# Patient Record
Sex: MALE | Race: WHITE | URBAN - METROPOLITAN AREA
[De-identification: names, ages, dates, MRNs, and addresses within clinical notes are randomized per-mention and may not be internally consistent; named-entity substitution may affect disease eponyms.]

---

## 2017-01-03 ENCOUNTER — OFFICE VISIT (OUTPATIENT)
Dept: PEDIATRICS | Facility: CLINIC | Age: 22
End: 2017-01-03
Attending: PEDIATRICS
Payer: COMMERCIAL

## 2017-01-03 DIAGNOSIS — E10.65 TYPE 1 DIABETES MELLITUS WITH HYPERGLYCEMIA (H): Primary | ICD-10-CM

## 2017-01-03 PROCEDURE — 95250 CONT GLUC MNTR PHYS/QHP EQP: CPT | Mod: ZF

## 2017-01-12 ENCOUNTER — TELEPHONE (OUTPATIENT)
Dept: PEDIATRICS | Facility: CLINIC | Age: 22
End: 2017-01-12

## 2017-01-12 NOTE — TELEPHONE ENCOUNTER
Prior Authorization Retail Medication Request  Medication/Dose: Apidra Insulin Vial-Uses up to 75 units daily via insulin pump (90 day supply)  Diagnosis and ICD code: E10.65  New/Renewal/Insurance Change PA: Renewal (?)  Previously Tried and Failed Therapies: Yes (Novolog and Humalog, he was switched to Apidra in September 2014)    Insurance ID (if provided): [employer]VS Waqar, [id number]  Insurance Phone (if provided):     Any additional info from fax request: Analog insulins (Novolog and Humalog) have caused allergic reactions for Jarred causing lipoatrophy.  Apidra has worked to not cause this issue.    If you received a fax notification from an outside Pharmacy:  Pharmacy Name: [facility]  Pharmacy #: [phone]   Pharmacy Fax: [phone]

## 2017-01-13 NOTE — TELEPHONE ENCOUNTER
[facility] Prior Authorization Team   Phone: [phone]  Fax: [phone]    PA Initiation    Medication: Apidra Insulin Vial-Uses up to 75 units daily via insulin pump (90 day supply)  Insurance Company: [employer]  Fax Number:      Phone Number: [phone]  Pharmacy Filling the Rx: [id number] [address]  Filling Pharmacy Phone: [phone]  Filling Pharmacy Fax: [phone]  Start Date: 1/13/2017

## 2017-01-17 NOTE — TELEPHONE ENCOUNTER
Prior Authorization Approval    Authorization Effective Date: 12/15/2016  Authorization Expiration Date: 1/14/2018  Medication: Apidra Insulin Vial-Uses up to 75 units daily via insulin pump (90 day supply)- approved  Approved Dose/Quantity:   Reference #:     Insurance Company:    Expected CoPay: $75.00     CoPay Card Available:      Foundation Assistance Needed:    Which Pharmacy is filling the prescription (Not needed for infusion/clinic administered): [id number] - [place], [place] - [address]  Pharmacy Notified:  yes  Patient Notified:  Pt picked up script on 01/12/17

## 2017-02-02 NOTE — PROGRESS NOTES
Data:  Juarez Pemberton  presents today for: Return type 1 diabetes  Juarez Pemberton is a 21 year old year old male.  Parent's names are: Tereza Felton (mother)  Juarez lives with mother.  HEMOGLOBIN A1C   Date Value Ref Range Status   12/27/2016 8.6 % Final     GLUCOSE   Date Value Ref Range Status   04/26/2016 127* 70 - 99 mg/dL Final     Intervention:   Education Topics discussed today:  Continuous glucose sensors   Inserted Dexcom professional CGM  Explained Calibration Rules  Benefits of CGM therapy  Assessment: Juarez and his mother verbalizes understanding of what was discussed today.    Plan:   Return to clinic in one week to return Dexcom.  Follow up as needed  Patient goal: possibly start own CGM after trial  Time spent with patient at today s visit was 30 minutes.

## 2017-05-16 ENCOUNTER — OFFICE VISIT (OUTPATIENT)
Dept: PEDIATRICS | Facility: CLINIC | Age: 22
End: 2017-05-16
Attending: PEDIATRICS
Payer: COMMERCIAL

## 2017-05-16 VITALS
WEIGHT: 121.03 LBS | HEART RATE: 77 BPM | HEIGHT: 65 IN | DIASTOLIC BLOOD PRESSURE: 66 MMHG | SYSTOLIC BLOOD PRESSURE: 105 MMHG | BODY MASS INDEX: 20.17 KG/M2

## 2017-05-16 DIAGNOSIS — E10.65 TYPE 1 DIABETES MELLITUS WITH HYPERGLYCEMIA (H): Primary | ICD-10-CM

## 2017-05-16 LAB — HBA1C MFR BLD: 8.9 % (ref 0–5.7)

## 2017-05-16 PROCEDURE — 83036 HEMOGLOBIN GLYCOSYLATED A1C: CPT | Mod: ZF | Performed by: PEDIATRICS

## 2017-05-16 PROCEDURE — 99211 OFF/OP EST MAY X REQ PHY/QHP: CPT | Mod: ZF

## 2017-05-16 ASSESSMENT — PAIN SCALES - GENERAL: PAINLEVEL: NO PAIN (0)

## 2017-05-16 NOTE — PATIENT INSTRUCTIONS
Focus needs to be on the following two things:  1) must start giving insulin before eating - really should be 10-15 minutes before eating.  Need to get away from postmeal bolus.  Even if you bolus for half of your upcoming meal about 10-15 minutes before eating and the rest of it at the time of your meal, that would really help your overall control  2) Remain consistent with your testing.  You should not have any nights when you go to bed without testing so that you can provide a bit of a buffer overnight if low or correct if you are high.     Omnipod   Basal rates:   12 AM: 0.9  7 AM: 1.1  12 PM: 1.2  9PM: 1.0    Carb ratio   12 AM: 10  1030 AM: 10    Correction factor   12 AM: 45  7 AM: 40  8PM: 45    Target Blood Glucose   12A target and threshold 130  7A target 80, threshold 130   9A target 110 threshold 130    Stop by lab before going back to [place] for fasting lab test to recheck your lipid panel and recheck urine protein.  Follow-up in August

## 2017-05-16 NOTE — MR AVS SNAPSHOT
After Visit Summary   5/16/2017    Juarez Pemberton    MRN: [id number]           Patient Information     Date Of Birth          1995        Visit Information        Provider Department      5/16/2017 10:20 AM Angel Escalante MD [facility]        Today's Diagnoses     Type 1 diabetes mellitus (H)    -  1    Type 1 diabetes mellitus with hyperglycemia (H)          Care Instructions    Focus needs to be on the following two things:  1) must start giving insulin before eating - really should be 10-15 minutes before eating.  Need to get away from postmeal bolus.  Even if you bolus for half of your upcoming meal about 10-15 minutes before eating and the rest of it at the time of your meal, that would really help your overall control  2) Remain consistent with your testing.  You should not have any nights when you go to bed without testing so that you can provide a bit of a buffer overnight if low or correct if you are high.     Omnipod   Basal rates:   12 AM: 0.9  7 AM: 1.1  12 PM: 1.2  9PM: 1.0    Carb ratio   12 AM: 10  1030 AM: 10    Correction factor   12 AM: 45  7 AM: 40  8PM: 45    Target Blood Glucose   12A target and threshold 130  7A target 80, threshold 130   9A target 110 threshold 130    Stop by lab before going back to [place] for fasting lab test to recheck your lipid panel and recheck urine protein.  Follow-up in August        Follow-ups after your visit        Future tests that were ordered for you today     Open Future Orders        Priority Expected Expires Ordered    Lipid Profile Routine 5/18/2017 5/16/2018 5/16/2017    Basic metabolic panel Routine 5/18/2017 5/16/2018 5/16/2017    Albumin Random Urine Quantitative Routine 5/18/2017 5/16/2018 5/16/2017    TSH with free T4 reflex Routine 5/18/2017 5/16/2018 5/16/2017            Who to contact     If you have questions or need follow up information about today's clinic visit or your schedule please  "contact [facility] directly at [phone].  Normal or non-critical lab and imaging results will be communicated to you by MyChart, letter or phone within 4 business days after the clinic has received the results. If you do not hear from us within 7 days, please contact the clinic through MyChart or phone. If you have a critical or abnormal lab result, we will notify you by phone as soon as possible.  Submit refill requests through [employer] or call your pharmacy and they will forward the refill request to us. Please allow 3 business days for your refill to be completed.          Additional Information About Your Visit        [employer] Information     [employer] lets you send messages to your doctor, view your test results, renew your prescriptions, schedule appointments and more. To sign up, go to [email or web address] . Click on \"Log in\" on the left side of the screen, which will take you to the Welcome page. Then click on \"Sign up Now\" on the right side of the page.     You will be asked to enter the access code listed below, as well as some personal information. Please follow the directions to create your username and password.     Your access code is: [email or web address]  Expires: 2017 11:16 AM     Your access code will  in 90 days. If you need help or a new code, please call your [facility] clinic or [phone].        Care EveryWhere ID     This is your Care EveryWhere ID. This could be used by other organizations to access your [place] medical records  [id number]        Your Vitals Were     Pulse Height BMI (Body Mass Index)             77 1.66 m (5' 5.35\") 19.92 kg/m2          Blood Pressure from Last 3 Encounters:   17 105/66   16 113/79   16 111/57    Weight from Last 3 Encounters:   17 54.9 kg (121 lb 0.5 oz) (4 %)*   16 55.3 kg (121 lb 14.6 oz) (5 %)*   16 54.4 kg (119 lb 14.9 oz) (5 %)*     * Growth percentiles are based on CDC " 2-20 Years data.              We Performed the Following     Hemoglobin A1c POCT        Primary Care Provider Office Phone # Fax #    Markell Zacarias [phone]       [facility] [address] [facility]  [address]  [facility] [address]        Thank you!     Thank you for choosing [facility]  for your care. Our goal is always to provide you with excellent care. Hearing back from our patients is one way we can continue to improve our services. Please take a few minutes to complete the written survey that you may receive in the mail after your visit with us. Thank you!             Your Updated Medication List - Protect others around you: Learn how to safely use, store and throw away your medicines at www.disposemymeds.org.          This list is accurate as of: 5/16/17 11:16 AM.  Always use your most recent med list.                   Brand Name Dispense Instructions for use    acetone (Urine) test Strp    KETOSTIX    50 each    Patient to test when blood glucose is >250x2 or when sick and vomiting       Cromolyn Sodium Powd     500 g    Please compound in petrolatum solvent to make 4% topical cream. Patient is to apply cream to old areas of lipoatrophy twice daily       FREESTYLE TEST STRIPS test strip   Generic drug:  blood glucose monitoring     300 each    Patient tests up to 10 times/day       glucagon 1 MG kit    GLUCAGON EMERGENCY    1 each    Inject 1 mg intramuscular for unconscious hypoglycemia.       insulin glulisine 100 UNIT/ML injection    APIDRA VIAL    70 mL    Using up to 75 Units/day       * insulin detemir 100 UNIT/ML injection    LEVEMIR VIAL    10 mL    25 Units once daily when off pump       * LEVEMIR VIAL 100 UNIT/ML injection   Generic drug:  insulin detemir      Inject  Subcutaneous See Admin Instructions.       * Notice:  This list has 2 medication(s) that are the same as other medications prescribed for you. Read the directions  carefully, and ask your doctor or other care provider to review them with you.

## 2017-05-16 NOTE — PROGRESS NOTES
Pediatric Endocrinology Follow-up Consultation: Diabetes    Patient: Juarez Pemberton MRN# [id number]   YOB: 1995 Age: 20 year old   Date of Visit: 5/16/2017    Dear Dr. Markell Zacarias:    I had the pleasure of seeing your patient, Juarez Pemberton in the Pediatric Endocrinology Clinic, [address], on 5/16/2017 for a follow-up consultation of type 1 diabetes mellitus.           Problem list:     Patient Active Problem List    Diagnosis Date Noted     Type 1 diabetes mellitus (H) 02/21/2012     Problem list name updated by automated process. Provider to review              HPI:   Juarez is a 21 year old male with Type 1 diabetes mellitus, who was accompanied by his mother.  He was last seen in clinic on 12/27/2016. We made a slight adjustment to basal rates in afternoon and carb ratio at our last visit.  I encouraged him to keep up consistent testing and consider the use of CGM, especially because he had too many highs in the morning.  He has been at college at [employer] in [place].  Playing [employer] at school and biking.  Suspending for 1-2 hours if he knows he is going to be exercising a lot.  Sometimes in range, sometimes still lwo and sometimes high.  He has had to back down on his basal rates across the board a bit due to lows.  Ill over past week with cold symptoms and numbers have been more up and down.  He is noting higher numbers in the evenings (6-10pm).  Not cutting back on carbs.  Bolus is 5-10 minutes after eating.    Today's concerns include: none    Hypoglycemia: Juarez is having 4 hypoglycemic readings per week.   Hyperglycemia:   DKA: none  Elevated BG values tend to occur     Exercise: Biking, walking in woods    Blood Glucose Data:   Overall average: 185 mg/dL,   Breakfast: 158 mg/dL  Lunch: 194 mg/dL  Dinner: 164 mg/dL  Bedtime: 216mg/dL  BG checks/day: 3.1   Highs after lunch, tight mid morning, high overnight, low between  8-10pm       A1c:  Today s hemoglobin A1c: 8.9  Previous two HbA1c results:   Lab Results   Component Value Date    A1C 8.6 12/27/2016    A1C 8.5 08/23/2016    A1C 8.6 04/26/2016    A1C 8.4 01/19/2016    A1C 8.5 10/20/2015       Result was discussed at today's visit.     Current insulin regimen:    Omnipod   Basal rates:   12 AM: 0.9  7 AM: 1.1  1PM: 1.2  9PM: 1.0    Carb ratio   12 AM: 10  1030 AM: 9    Correction factor   12 AM: 35    Target Blood Glucose   12A target and threshold 130  7A target 80, threshold 130   9A target 110 threshold 130    Insulin administration site(s): abdomen, arms - changing every 3-4 days    Total insulin 42.9 (decrease by 7 units)  Basal 49% (increase by 2%)  About 4 boluses on average    I reviewed new history from the patient and the medical record.  I have reviewed previous lab results and records, patient BMI and the growth chart at today's visit.  I have reviewed the pump download,  glucometer download, .    History was obtained from patient.          Social History:     Social History     Social History Narrative    Starting college this fall at [employer] in [place].     School going well - probably computer science and electrical engineering - living in dorm  Internship planned in [employer] for summer - renting a room.         Family History:   No family history on file.    Family history was reviewed and is unchanged. Refer to the initial note.         Allergies:   No Known Allergies          Medications:     Current Outpatient Prescriptions   Medication Sig Dispense Refill     FREESTYLE TEST STRIPS test strip Patient tests up to 10 times/day 300 each 11     acetone, Urine, test (KETOSTIX) STRP Patient to test when blood glucose is >250x2 or when sick and vomiting 50 each 3     glucagon (GLUCAGON EMERGENCY) 1 MG injection Inject 1 mg intramuscular for unconscious hypoglycemia. 1 each 4     insulin glulisine (APIDRA VIAL) 100 UNIT/ML VIAL Using up to 75 Units/day 70  "mL 4     insulin detemir (LEVEMIR VIAL) 100 UNIT/ML VIAL 25 Units once daily when off pump 10 mL 6     Cromolyn Sodium POWD Please compound in petrolatum solvent to make 4% topical cream. Patient is to apply cream to old areas of lipoatrophy twice daily 500 g 6     insulin detemir (LEVEMIR) 100 UNIT/ML injection Inject  Subcutaneous See Admin Instructions.               Review of Systems:   GENERAL:  Had a good energy level and appetite and is sleeping well.  EYE: No visual disturbance.  ENT: No hearing loss.  No nasal discharge.  No sore throat.  RESPIRATORY: No cough or wheezing  CARDIO: No chest pain. No palpitations.  No rapid heart rate. No hypertension.  GASTROINTESTINAL: No recent vomiting or diarrhea. No constipation. No abdominal pain.  HEMATOLOGIC: No bleeding disorders. No amemia.  GENITOURINARY: No dysuria or hematuria.  MUSCOLOSKELETAL: No joint pain. No muscular weakness.  PSYCHIATRIC: No significant sadness or irritability. No behavior concerns.  NEURO: No seizures.  No headaches. No focal deficits noted.  SKIN: No rashes or skin changes.  ENDOCRINE: see HPI         Physical Exam:   Blood pressure 105/66, pulse 77, height 1.66 m (5' 5.35\"), weight 54.9 kg (121 lb 0.5 oz).  Blood pressure percentiles are 11 % systolic and 21 % diastolic based on NHBPEP's 4th Report. Blood pressure percentile targets: 90: 132/90, 95: 135/95, 99 + 5 mmH/108.  Height: 5' 5.354\", 7 %ile based on CDC 2-20 Years stature-for-age data using vitals from 2017.  Weight: 121 lbs .52 oz, 4 %ile based on CDC 2-20 Years weight-for-age data using vitals from 2017.  BMI: Body mass index is 19.92 kg/(m^2)., 12 %ile based on CDC 2-20 Years BMI-for-age data using vitals from 2017.      CONSTITUTIONAL:   Awake, alert, and in no apparent distress.  HEAD: Normocephalic, without obvious abnormality.  EYES: Lids and lashes normal, sclera clear, conjunctiva normal.  ENT: external ears without lesions, nares clear, oral " pharynx with moist mucus membranes.  NECK: Supple, symmetrical, trachea midline.  THYROID: symmetric, not enlarged and no tenderness.  HEMATOLOGIC/LYMPHATIC: No cervical lymphadenopathy.  LUNGS: No increased work of breathing, clear to auscultation bilaterally with good air entry.  CARDIOVASCULAR: Regular rate and rhythm, no murmurs.  ABDOMEN: Normal bowel sounds, soft, non-distended, non-tender, no masses palpated, no hepatosplenomegally.  NEUROLOGIC:No focal deficits noted. Reflexes were symmetric at patella bilaterally.  PSYCHIATRIC: Cooperative, no agitation.  SKIN: Insulin administration sites intact without lipohypertrophy. No acanthosis nigricans.  Omnipod in place on right upper extremity.   MUSCULOSKELETAL: There is no redness, warmth, or swelling of the joints.  Full range of motion noted.  Motor strength and tone are normal.        Health Maintenance:   Last yearly labs: 4/16  Last dental exam: 8/16  Ophtho: 3/17 - [facility]  Last influenza vaccine: 4738-5565 season  Blood pressure IS consistently <130/80 or below the 90th percentile for age, sex, and height whichever is lower.   Severe hypoglycemia since last visit: None   DKA episodes since last visit: None  Today's PHQ-2 Score: 0        Laboratory results:     Hemoglobin A1C   Date Value Ref Range Status   12/27/2016 8.6 % Final     TSH   Date Value Ref Range Status   04/26/2016 0.83 0.40 - 4.00 mU/L Final     T4 Free   Date Value Ref Range Status   11/01/2011 1.04 0.70 - 1.85 ng/dL Final     Tissue Transglutaminase Antibody IgA   Date Value Ref Range Status   04/26/2016 1 <7 U/mL Final     Comment:     Negative     Tissue Transglutaminase Pamela IgG   Date Value Ref Range Status   04/26/2016 1 <7 U/mL Final     Comment:     Negative     Cholesterol   Date Value Ref Range Status   04/26/2016 192 (H) <170 mg/dL Final     Comment:     Borderline high:  170-199 mg/dl   High:            >199 mg/dl       Albumin Urine mg/L   Date Value Ref Range  Status   04/26/2016 15 mg/L Final     Triglycerides   Date Value Ref Range Status   04/26/2016 160 (H) <90 mg/dL Final     Comment:     Borderline high:   mg/dl   High:            >129 mg/dl       HDL Cholesterol   Date Value Ref Range Status   04/26/2016 50 >45 mg/dL Final     LDL Cholesterol Calculated   Date Value Ref Range Status   04/26/2016 110 (H) <110 mg/dL Final     Comment:     Borderline high:  110-129 mg/dl   High:            >129 mg/dl       Cholesterol/HDL Ratio   Date Value Ref Range Status   04/10/2015 3.3 0.0 - 5.0 Final     Non HDL Cholesterol   Date Value Ref Range Status   04/26/2016 142 (H) <120 mg/dL Final     Comment:     Borderline high:  120-144 mg/dl   High:            >144 mg/dl              Assessment and Plan:   Juarez  is a 20 year old male with Type 1 diabetes mellitus.  Jarred is just not getting the level of control that he needs.  Unfortunately, he did not give the CGM much of a try which I think could have really helped.  He is not testing enough at school and is bolusing after he eats which absolutely has to change in order for to achieve adequate glycemic control.  I am hopeful that backing off in a few areas of his pump will allow Jarred to feel comfortable giving his insulin before eating.  I would like to follow up on his relative dyslipidemic panel from last year with a fasting test before he goes back to school.    Orders Placed This Encounter   Procedures     Hemoglobin A1c POCT     Lipid Profile     Basic metabolic panel     Albumin Random Urine Quantitative     TSH with free T4 reflex     Patient Instructions   Focus needs to be on the following two things:  1) must start giving insulin before eating - really should be 10-15 minutes before eating.  Need to get away from postmeal bolus.  Even if you bolus for half of your upcoming meal about 10-15 minutes before eating and the rest of it at the time of your meal, that would really help your overall control  2) Remain  consistent with your testing.  You should not have any nights when you go to bed without testing so that you can provide a bit of a buffer overnight if low or correct if you are high.     Omnipod   Basal rates:   12 AM: 0.9  7 AM: 1.1  12 PM: 1.2  9PM: 1.0    Carb ratio   12 AM: 10  1030 AM: 10    Correction factor   12 AM: 45  7 AM: 40  8PM: 45    Target Blood Glucose   12A target and threshold 130  7A target 80, threshold 130   9A target 110 threshold 130    Stop by lab before going back to [place] for fasting lab test to recheck your lipid panel and recheck urine protein.  Follow-up in August        Thank you for allowing me to participate in the care of your patient.  Please do not hesitate to call Dr Escalante with questions or concerns.    Sincerely,    Angel Escalante MD    Pager [phone]        CC  Patient Care Team:  Markell Zacarias MD as PCP - General (Pediatrics)  MARKELL ZACARIAS    Copy to patient  Tereza Felton   [address] [facility] [id number]

## 2017-05-16 NOTE — NURSING NOTE
"Informant-    Juarez is accompanied by self    Reason for Visit-  F/u diabetes    Vitals signs-  /66  Pulse 77  Ht 1.66 m (5' 5.35\")  Wt 54.9 kg (121 lb 0.5 oz)  BMI 19.92 kg/m2    Face to Face time: 3 min    Opal Brown RN        "

## 2017-05-18 ENCOUNTER — TRANSFERRED RECORDS (OUTPATIENT)
Dept: HEALTH INFORMATION MANAGEMENT | Facility: CLINIC | Age: 22
End: 2017-05-18

## 2017-05-19 ENCOUNTER — TRANSFERRED RECORDS (OUTPATIENT)
Dept: HEALTH INFORMATION MANAGEMENT | Facility: CLINIC | Age: 22
End: 2017-05-19

## 2017-08-22 ENCOUNTER — OFFICE VISIT (OUTPATIENT)
Dept: PEDIATRICS | Facility: CLINIC | Age: 22
End: 2017-08-22
Attending: PEDIATRICS
Payer: COMMERCIAL

## 2017-08-22 ENCOUNTER — HOSPITAL ENCOUNTER (OUTPATIENT)
Dept: LAB | Facility: CLINIC | Age: 22
Discharge: HOME | End: 2017-08-22
Attending: PEDIATRICS | Admitting: PEDIATRICS
Payer: COMMERCIAL

## 2017-08-22 VITALS
SYSTOLIC BLOOD PRESSURE: 117 MMHG | WEIGHT: 126.32 LBS | BODY MASS INDEX: 20.3 KG/M2 | DIASTOLIC BLOOD PRESSURE: 73 MMHG | HEART RATE: 74 BPM | HEIGHT: 66 IN

## 2017-08-22 DIAGNOSIS — E10.65 TYPE 1 DIABETES MELLITUS WITH HYPERGLYCEMIA (H): Primary | ICD-10-CM

## 2017-08-22 DIAGNOSIS — E10.65 TYPE 1 DIABETES MELLITUS WITH HYPERGLYCEMIA (H): ICD-10-CM

## 2017-08-22 LAB
ANION GAP SERPL CALCULATED.3IONS-SCNC: 3 MMOL/L (ref 3–14)
BUN SERPL-MCNC: 14 MG/DL (ref 7–30)
CALCIUM SERPL-MCNC: 9.3 MG/DL (ref 8.5–10.1)
CHLORIDE SERPL-SCNC: 104 MMOL/L (ref 98–110)
CHOLEST SERPL-MCNC: 211 MG/DL
CO2 SERPL-SCNC: 33 MMOL/L (ref 20–32)
CREAT SERPL-MCNC: 0.88 MG/DL (ref 0.5–1)
CREAT UR-MCNC: 285 MG/DL
GFR SERPL CREATININE-BSD FRML MDRD: >90 ML/MIN/1.7M2
GLUCOSE SERPL-MCNC: 155 MG/DL (ref 70–99)
HBA1C MFR BLD: 8.2 % (ref 0–5.7)
HDLC SERPL-MCNC: 62 MG/DL
LDLC SERPL CALC-MCNC: 137 MG/DL
MICROALBUMIN UR-MCNC: 13 MG/L
MICROALBUMIN/CREAT UR: 4.42 MG/G CR (ref 0–17)
NONHDLC SERPL-MCNC: 149 MG/DL
POTASSIUM SERPL-SCNC: 4.1 MMOL/L (ref 3.4–5.3)
SODIUM SERPL-SCNC: 140 MMOL/L (ref 133–144)
TRIGL SERPL-MCNC: 60 MG/DL
TSH SERPL DL<=0.005 MIU/L-ACNC: 0.88 MU/L (ref 0.4–4)

## 2017-08-22 PROCEDURE — 84443 ASSAY THYROID STIM HORMONE: CPT | Performed by: PEDIATRICS

## 2017-08-22 PROCEDURE — 99211 OFF/OP EST MAY X REQ PHY/QHP: CPT | Mod: ZF

## 2017-08-22 PROCEDURE — 80048 BASIC METABOLIC PNL TOTAL CA: CPT | Performed by: PEDIATRICS

## 2017-08-22 PROCEDURE — 83036 HEMOGLOBIN GLYCOSYLATED A1C: CPT | Mod: ZF | Performed by: PEDIATRICS

## 2017-08-22 PROCEDURE — 80061 LIPID PANEL: CPT | Performed by: PEDIATRICS

## 2017-08-22 PROCEDURE — 36415 COLL VENOUS BLD VENIPUNCTURE: CPT | Performed by: PEDIATRICS

## 2017-08-22 PROCEDURE — 82043 UR ALBUMIN QUANTITATIVE: CPT | Performed by: PEDIATRICS

## 2017-08-22 ASSESSMENT — PAIN SCALES - GENERAL: PAINLEVEL: NO PAIN (0)

## 2017-08-22 NOTE — MR AVS SNAPSHOT
"              After Visit Summary   8/22/2017    Juarez Pemberton    MRN: [id number]           Patient Information     Date Of Birth          1995        Visit Information        Provider Department      8/22/2017 10:20 AM Angel Escalante MD [facility]        Today's Diagnoses     Type 1 diabetes mellitus with hyperglycemia (H)    -  1      Care Instructions    No changes to settings for today  Keep working on premeal bolusing! - 10 minutes before eating  Labs today  Follow-up over thanksgiving or winter break.            Follow-ups after your visit        Who to contact     If you have questions or need follow up information about today's clinic visit or your schedule please contact [facility] directly at [phone].  Normal or non-critical lab and imaging results will be communicated to you by [employer], letter or phone within 4 business days after the clinic has received the results. If you do not hear from us within 7 days, please contact the clinic through [employer] or phone. If you have a critical or abnormal lab result, we will notify you by phone as soon as possible.  Submit refill requests through [employer] or call your pharmacy and they will forward the refill request to us. Please allow 3 business days for your refill to be completed.          Additional Information About Your Visit        [employer] Information     [employer] lets you send messages to your doctor, view your test results, renew your prescriptions, schedule appointments and more. To sign up, go to [email or web address] . Click on \"Log in\" on the left side of the screen, which will take you to the Welcome page. Then click on \"Sign up Now\" on the right side of the page.     You will be asked to enter the access code listed below, as well as some personal information. Please follow the directions to create your username and password.     Your access code is: " [email or web address]  Expires: 2017 10:49 AM     Your access code will  in 90 days. If you need help or a new code, please call your [facility] clinic or [phone].        Care EveryWhere ID     This is your Care EveryWhere ID. This could be used by other organizations to access your [facility] medical records  [id number]        Your Vitals Were     Pulse Height BMI (Body Mass Index)             74 1.665 m (5' 5.55\") 20.67 kg/m2          Blood Pressure from Last 3 Encounters:   17 117/73   17 105/66   16 113/79    Weight from Last 3 Encounters:   17 57.3 kg (126 lb 5.2 oz) (8 %)*   17 54.9 kg (121 lb 0.5 oz) (4 %)*   16 55.3 kg (121 lb 14.6 oz) (5 %)*     * Growth percentiles are based on [facility] 2-20 Years data.              We Performed the Following     Hemoglobin A1c POCT        Primary Care Provider Office Phone # Fax #    Markell Zacarias [phone]       [facility] [address] [place] [address]        Equal Access to Services     BEBE FARAH : Hadii aad ku hadasho Soomaali, waaxda luqadaha, qaybta kaalmada adeegyada, christian wheat haymeaghan sheikh . So [facility] [phone].    ATENCIÓN: Si habla español, tiene a cadet disposición servicios gratuitos de asistencia lingüística. ame al [phone].    We comply with applicable federal civil rights laws and [place] laws. We do not discriminate on the basis of race, color, national origin, age, disability sex, sexual orientation or gender identity.            Thank you!     Thank you for choosing [facility]  for your care. Our goal is always to provide you with excellent care. Hearing back from our patients is one way we can continue to improve our services. Please take a few minutes to complete the written survey that you may receive in the mail after your visit with us. Thank you!             Your Updated Medication List - Protect " others around you: Learn how to safely use, store and throw away your medicines at www.disposemymeds.org.          This list is accurate as of: 8/22/17 10:49 AM.  Always use your most recent med list.                   Brand Name Dispense Instructions for use Diagnosis    acetone (Urine) test Strp    KETOSTIX    50 each    Patient to test when blood glucose is >250x2 or when sick and vomiting    Type 1 diabetes mellitus without complication (H)       Cromolyn Sodium Powd     500 g    Please compound in petrolatum solvent to make 4% topical cream. Patient is to apply cream to old areas of lipoatrophy twice daily    Type I (juvenile type) diabetes mellitus without mention of complication, not stated as uncontrolled       FREESTYLE TEST STRIPS test strip   Generic drug:  blood glucose monitoring     300 each    Patient tests up to 10 times/day    Type 1 diabetes mellitus without complication (H)       glucagon 1 MG kit    GLUCAGON EMERGENCY    1 each    Inject 1 mg intramuscular for unconscious hypoglycemia.    Type 1 diabetes mellitus without complication (H)       insulin glulisine 100 UNIT/ML injection    APIDRA VIAL    70 mL    Using up to 75 Units/day    Type 1 diabetes mellitus without complication (H)       * insulin detemir 100 UNIT/ML injection    LEVEMIR VIAL    10 mL    25 Units once daily when off pump    Type 1 diabetes mellitus without complication (H)       * LEVEMIR VIAL 100 UNIT/ML injection   Generic drug:  insulin detemir      Inject  Subcutaneous See Admin Instructions.    Diabetes mellitus, type 1       * Notice:  This list has 2 medication(s) that are the same as other medications prescribed for you. Read the directions carefully, and ask your doctor or other care provider to review them with you.

## 2017-08-22 NOTE — NURSING NOTE
"Informant-    Juarez is accompanied by mother    Reason for Visit-  Diabetes     Vitals signs-  /73  Pulse 74  Ht 1.665 m (5' 5.55\")  Wt 57.3 kg (126 lb 5.2 oz)  BMI 20.67 kg/m2    There are concerns about the child's exposure to violence in the home: No    Face to Face time: 5 minutes  Eli Rodriguez MA      "

## 2017-08-22 NOTE — LETTER
8/22/2017      RE: Juarez Pemberton  [address] [facility] [address]       Pediatric Endocrinology Follow-up Consultation: Diabetes    Patient: Juarez Pemberton MRN# [id number]   YOB: 1995 Age: 21 year old   Date of Visit: 8/22/2017    Dear Dr. Markell Zacarias:    I had the pleasure of seeing your patient, Juarez Pemberton in the Pediatric Endocrinology Clinic, [address], on 8/22/2017 for a follow-up consultation of type 1 diabetes mellitus.           Problem list:     Patient Active Problem List    Diagnosis Date Noted     Type 1 diabetes mellitus with hyperglycemia (H) 02/21/2012     Priority: Medium     Problem list name updated by automated process. Provider to review              HPI:   Juarez is a 21 year old male with Type 1 diabetes mellitus, who was accompanied by his mother.  He was last seen in clinic on 5/16/2017. His control had fallen off while he was at school.  I tried to rengage him on bolusing before eating.  Unfortunately he has not been keen on the use of CGM.  I did make some adjustments to his pump settings.    He did make a small increase in overnight basal rate and cut his afternoon basal rate since last visit.  No other new health problems since last visit. He is working on bolusing before hand is and is much more aware than at last visit -  Not 100% but workign towards it.      Today's concerns include: none    Hypoglycemia: Juarez is having 2 hypoglycemic readings per week - typically related to activity - running or biking    Hyperglycemia:   DKA: none  Elevated BG values tend to occur     Exercise:     Blood Glucose Data:   Overall average: 171 mg/dL, SD 79  Breakfast: 147 mg/dL  Lunch: 170 mg/dL  Dinner: 204 mg/dL  Bedtime: 162mg/dL  BG checks/day: 4.4       A1c:  Today s hemoglobin A1c: 8.2  Previous two HbA1c results:   Lab Results   Component Value Date    A1C 8.2 08/22/2017    A1C 8.9 05/16/2017     A1C 8.6 12/27/2016    A1C 8.5 08/23/2016    A1C 8.6 04/26/2016         Result was discussed at today's visit.     Current insulin regimen:    Omnipod   Basal rates:   12 AM: 1  7 AM: 1.1  9PM: 1.0    Carb ratio   12 AM: 10    Correction factor   12 AM: 45  7 AM: 40  8PM: 45    Target Blood Glucose   12A target and threshold 130  7A target 80, threshold 130   9A target 110 threshold 130    Insulin administration site(s): abdomen, arms - changing every 3-4 days    Total insulin 45.9 (increase by 3 units)  Basal 47% (decrease by 2%)  About 4 boluses on average    I reviewed new history from the patient and the medical record.  I have reviewed previous lab results and records, patient BMI and the growth chart at today's visit.  I have reviewed the pump download,  glucometer download, .    History was obtained from patient.          Social History:     Social History     Social History Narrative    Starting college this fall at [employer] in [place].     Internship in [facility] for summer - went well  Back home for 2 weeks before going back to school.         Family History:   No family history on file.    Family history was reviewed and is unchanged. Refer to the initial note.         Allergies:   No Known Allergies          Medications:     Current Outpatient Prescriptions   Medication Sig Dispense Refill     FREESTYLE TEST STRIPS test strip Patient tests up to 10 times/day 300 each 11     acetone, Urine, test (KETOSTIX) STRP Patient to test when blood glucose is >250x2 or when sick and vomiting 50 each 3     glucagon (GLUCAGON EMERGENCY) 1 MG injection Inject 1 mg intramuscular for unconscious hypoglycemia. 1 each 4     insulin glulisine (APIDRA VIAL) 100 UNIT/ML VIAL Using up to 75 Units/day 70 mL 4     insulin detemir (LEVEMIR VIAL) 100 UNIT/ML VIAL 25 Units once daily when off pump 10 mL 6     Cromolyn Sodium POWD Please compound in petrolatum solvent to make 4% topical cream. Patient is to apply cream to  "old areas of lipoatrophy twice daily 500 g 6     insulin detemir (LEVEMIR) 100 UNIT/ML injection Inject  Subcutaneous See Admin Instructions.               Review of Systems:   GENERAL:  Had a good energy level and appetite and is sleeping well.  EYE: No visual disturbance.  ENT: No hearing loss.  No nasal discharge.  No sore throat.  RESPIRATORY: No cough or wheezing  CARDIO: No chest pain. No palpitations.  No rapid heart rate. No hypertension.  GASTROINTESTINAL: No recent vomiting or diarrhea. No constipation. No abdominal pain.  HEMATOLOGIC: No bleeding disorders. No amemia.  GENITOURINARY: No dysuria or hematuria.  MUSCOLOSKELETAL: No joint pain. No muscular weakness.  PSYCHIATRIC: No significant sadness or irritability. No behavior concerns.  NEURO: No seizures.  No headaches. No focal deficits noted.  SKIN: No rashes or skin changes.  ENDOCRINE: see HPI         Physical Exam:   Blood pressure 117/73, pulse 74, height 1.665 m (5' 5.55\"), weight 57.3 kg (126 lb 5.2 oz).  Blood pressure percentiles are 46 % systolic and 36 % diastolic based on NHBPEP's 4th Report. Blood pressure percentile targets: 90: 132/92, 95: 136/96, 99 + 5 mmH/109.  Height: 5' 5.551\", 7 %ile based on CDC 2-20 Years stature-for-age data using vitals from 2017.  Weight: 126 lbs 5.18 oz, 8 %ile based on CDC 2-20 Years weight-for-age data using vitals from 2017.  BMI: Body mass index is 20.67 kg/(m^2)., 19 %ile based on CDC 2-20 Years BMI-for-age data using vitals from 2017.      CONSTITUTIONAL:   Awake, alert, and in no apparent distress.  HEAD: Normocephalic, without obvious abnormality.  EYES: Lids and lashes normal, sclera clear, conjunctiva normal.  ENT: external ears without lesions, nares clear, oral pharynx with moist mucus membranes.  NECK: Supple, symmetrical, trachea midline.  THYROID: symmetric, not enlarged and no tenderness.  HEMATOLOGIC/LYMPHATIC: No cervical lymphadenopathy.  LUNGS: No increased work of " breathing, clear to auscultation bilaterally with good air entry.  CARDIOVASCULAR: Regular rate and rhythm, no murmurs.  ABDOMEN: Normal bowel sounds, soft, non-distended, non-tender, no masses palpated, no hepatosplenomegally.  NEUROLOGIC:No focal deficits noted. Reflexes were symmetric at patella bilaterally.  PSYCHIATRIC: Cooperative, no agitation.  SKIN: Insulin administration sites intact without lipohypertrophy. No acanthosis nigricans.  Omnipod in place on right upper extremity.   MUSCULOSKELETAL: There is no redness, warmth, or swelling of the joints.  Full range of motion noted.  Motor strength and tone are normal.        Health Maintenance:   Last yearly labs: 4/16  Last dental exam: 8/17  Ophtho: 5/17 - [facility]  Last influenza vaccine: 8060-4258 season  Blood pressure IS consistently <130/80 or below the 90th percentile for age, sex, and height whichever is lower.   Severe hypoglycemia since last visit: None   DKA episodes since last visit: None  Today's PHQ-2 Score: 0        Laboratory results:     Hemoglobin A1C   Date Value Ref Range Status   08/22/2017 8.2 % Final     TSH   Date Value Ref Range Status   04/26/2016 0.83 0.40 - 4.00 mU/L Final     T4 Free   Date Value Ref Range Status   11/01/2011 1.04 0.70 - 1.85 ng/dL Final     Tissue Transglutaminase Antibody IgA   Date Value Ref Range Status   04/26/2016 1 <7 U/mL Final     Comment:     Negative     Tissue Transglutaminase Pamela IgG   Date Value Ref Range Status   04/26/2016 1 <7 U/mL Final     Comment:     Negative     Cholesterol   Date Value Ref Range Status   04/26/2016 192 (H) <170 mg/dL Final     Comment:     Borderline high:  170-199 mg/dl   High:            >199 mg/dl       Albumin Urine mg/L   Date Value Ref Range Status   04/26/2016 15 mg/L Final     Triglycerides   Date Value Ref Range Status   04/26/2016 160 (H) <90 mg/dL Final     Comment:     Borderline high:   mg/dl   High:            >129 mg/dl       HDL Cholesterol    Date Value Ref Range Status   04/26/2016 50 >45 mg/dL Final     LDL Cholesterol Calculated   Date Value Ref Range Status   04/26/2016 110 (H) <110 mg/dL Final     Comment:     Borderline high:  110-129 mg/dl   High:            >129 mg/dl       Cholesterol/HDL Ratio   Date Value Ref Range Status   04/10/2015 3.3 0.0 - 5.0 Final     Non HDL Cholesterol   Date Value Ref Range Status   04/26/2016 142 (H) <120 mg/dL Final     Comment:     Borderline high:  120-144 mg/dl   High:            >144 mg/dl              Assessment and Plan:   Juarez  is a 21 year old male with Type 1 diabetes mellitus with stable glycemic control and a  Nice improvement in his A1c.  Jarred has farily stable days with the majority of his BG levels in the euglycemic range.  I did not see any opportunities to make additional adjustments today but do feel he would benefit tremendously by premeal bolusing to make up that last 0.5% or so of his A1c difference.  I also think he would benefit from CGM device but he has been resistant to this modality.  I did request labs today to follow-up on his lipid panel.    Patient Instructions   No changes to settings for today  Keep working on premeal bolusing! - 10 minutes before eating  Labs today  Orders Placed This Encounter   Procedures     Hemoglobin A1c POCT       Follow-up over thanksgiving or winter break.      Thank you for allowing me to participate in the care of your patient.  Please do not hesitate to call Dr Escalante with questions or concerns.    Sincerely,    Angel Escalante MD    Pager [phone]        CC  Patient Care Team:  Markell Zacarias MD as PCP - General (Pediatrics)  MARKELL ZACARIAS    Copy to patient  Tereza Felton   [address] [facility] [address]    Angel Escalante MD

## 2017-08-22 NOTE — PROGRESS NOTES
Pediatric Endocrinology Follow-up Consultation: Diabetes    Patient: Juarez Pemberton MRN# [id number]   YOB: 1995 Age: 21 year old   Date of Visit: 8/22/2017    Dear Dr. Markell Zacarias:    I had the pleasure of seeing your patient, Juarez Pemberton in the Pediatric Endocrinology Clinic, [address], on 8/22/2017 for a follow-up consultation of type 1 diabetes mellitus.           Problem list:     Patient Active Problem List    Diagnosis Date Noted     Type 1 diabetes mellitus with hyperglycemia (H) 02/21/2012     Priority: Medium     Problem list name updated by automated process. Provider to review              HPI:   Juarez is a 21 year old male with Type 1 diabetes mellitus, who was accompanied by his mother.  He was last seen in clinic on 5/16/2017. His control had fallen off while he was at school.  I tried to rengage him on bolusing before eating.  Unfortunately he has not been keen on the use of CGM.  I did make some adjustments to his pump settings.    He did make a small increase in overnight basal rate and cut his afternoon basal rate since last visit.  No other new health problems since last visit. He is working on bolusing before hand is and is much more aware than at last visit -  Not 100% but workign towards it.      Today's concerns include: none    Hypoglycemia: Juarez is having 2 hypoglycemic readings per week - typically related to activity - running or biking    Hyperglycemia:   DKA: none  Elevated BG values tend to occur     Exercise:     Blood Glucose Data:   Overall average: 171 mg/dL, SD 79  Breakfast: 147 mg/dL  Lunch: 170 mg/dL  Dinner: 204 mg/dL  Bedtime: 162mg/dL  BG checks/day: 4.4       A1c:  Today s hemoglobin A1c: 8.2  Previous two HbA1c results:   Lab Results   Component Value Date    A1C 8.2 08/22/2017    A1C 8.9 05/16/2017    A1C 8.6 12/27/2016    A1C 8.5 08/23/2016    A1C 8.6 04/26/2016         Result was discussed at  today's visit.     Current insulin regimen:    Omnipod   Basal rates:   12 AM: 1  7 AM: 1.1  9PM: 1.0    Carb ratio   12 AM: 10    Correction factor   12 AM: 45  7 AM: 40  8PM: 45    Target Blood Glucose   12A target and threshold 130  7A target 80, threshold 130   9A target 110 threshold 130    Insulin administration site(s): abdomen, arms - changing every 3-4 days    Total insulin 45.9 (increase by 3 units)  Basal 47% (decrease by 2%)  About 4 boluses on average    I reviewed new history from the patient and the medical record.  I have reviewed previous lab results and records, patient BMI and the growth chart at today's visit.  I have reviewed the pump download,  glucometer download, .    History was obtained from patient.          Social History:     Social History     Social History Narrative    Starting college this fall at [employer] in [place].     Internship in [employer] for summer - went well  Back home for 2 weeks before going back to school.         Family History:   No family history on file.    Family history was reviewed and is unchanged. Refer to the initial note.         Allergies:   No Known Allergies          Medications:     Current Outpatient Prescriptions   Medication Sig Dispense Refill     FREESTYLE TEST STRIPS test strip Patient tests up to 10 times/day 300 each 11     acetone, Urine, test (KETOSTIX) STRP Patient to test when blood glucose is >250x2 or when sick and vomiting 50 each 3     glucagon (GLUCAGON EMERGENCY) 1 MG injection Inject 1 mg intramuscular for unconscious hypoglycemia. 1 each 4     insulin glulisine (APIDRA VIAL) 100 UNIT/ML VIAL Using up to 75 Units/day 70 mL 4     insulin detemir (LEVEMIR VIAL) 100 UNIT/ML VIAL 25 Units once daily when off pump 10 mL 6     Cromolyn Sodium POWD Please compound in petrolatum solvent to make 4% topical cream. Patient is to apply cream to old areas of lipoatrophy twice daily 500 g 6     insulin detemir (LEVEMIR) 100 UNIT/ML  "injection Inject  Subcutaneous See Admin Instructions.               Review of Systems:   GENERAL:  Had a good energy level and appetite and is sleeping well.  EYE: No visual disturbance.  ENT: No hearing loss.  No nasal discharge.  No sore throat.  RESPIRATORY: No cough or wheezing  CARDIO: No chest pain. No palpitations.  No rapid heart rate. No hypertension.  GASTROINTESTINAL: No recent vomiting or diarrhea. No constipation. No abdominal pain.  HEMATOLOGIC: No bleeding disorders. No amemia.  GENITOURINARY: No dysuria or hematuria.  MUSCOLOSKELETAL: No joint pain. No muscular weakness.  PSYCHIATRIC: No significant sadness or irritability. No behavior concerns.  NEURO: No seizures.  No headaches. No focal deficits noted.  SKIN: No rashes or skin changes.  ENDOCRINE: see HPI         Physical Exam:   Blood pressure 117/73, pulse 74, height 1.665 m (5' 5.55\"), weight 57.3 kg (126 lb 5.2 oz).  Blood pressure percentiles are 46 % systolic and 36 % diastolic based on NHBPEP's 4th Report. Blood pressure percentile targets: 90: 132/92, 95: 136/96, 99 + 5 mmH/109.  Height: 5' 5.551\", 7 %ile based on CDC 2-20 Years stature-for-age data using vitals from 2017.  Weight: 126 lbs 5.18 oz, 8 %ile based on CDC 2-20 Years weight-for-age data using vitals from 2017.  BMI: Body mass index is 20.67 kg/(m^2)., 19 %ile based on CDC 2-20 Years BMI-for-age data using vitals from 2017.      CONSTITUTIONAL:   Awake, alert, and in no apparent distress.  HEAD: Normocephalic, without obvious abnormality.  EYES: Lids and lashes normal, sclera clear, conjunctiva normal.  ENT: external ears without lesions, nares clear, oral pharynx with moist mucus membranes.  NECK: Supple, symmetrical, trachea midline.  THYROID: symmetric, not enlarged and no tenderness.  HEMATOLOGIC/LYMPHATIC: No cervical lymphadenopathy.  LUNGS: No increased work of breathing, clear to auscultation bilaterally with good air entry.  CARDIOVASCULAR: Regular " rate and rhythm, no murmurs.  ABDOMEN: Normal bowel sounds, soft, non-distended, non-tender, no masses palpated, no hepatosplenomegally.  NEUROLOGIC:No focal deficits noted. Reflexes were symmetric at patella bilaterally.  PSYCHIATRIC: Cooperative, no agitation.  SKIN: Insulin administration sites intact without lipohypertrophy. No acanthosis nigricans.  Omnipod in place on right upper extremity.   MUSCULOSKELETAL: There is no redness, warmth, or swelling of the joints.  Full range of motion noted.  Motor strength and tone are normal.        Health Maintenance:   Last yearly labs: 4/16  Last dental exam: 8/17  Ophtho: 5/17 - [facility]  Last influenza vaccine: 4077-1225 season  Blood pressure IS consistently <130/80 or below the 90th percentile for age, sex, and height whichever is lower.   Severe hypoglycemia since last visit: None   DKA episodes since last visit: None  Today's PHQ-2 Score: 0        Laboratory results:     Hemoglobin A1C   Date Value Ref Range Status   08/22/2017 8.2 % Final     TSH   Date Value Ref Range Status   04/26/2016 0.83 0.40 - 4.00 mU/L Final     T4 Free   Date Value Ref Range Status   11/01/2011 1.04 0.70 - 1.85 ng/dL Final     Tissue Transglutaminase Antibody IgA   Date Value Ref Range Status   04/26/2016 1 <7 U/mL Final     Comment:     Negative     Tissue Transglutaminase Pamela IgG   Date Value Ref Range Status   04/26/2016 1 <7 U/mL Final     Comment:     Negative     Cholesterol   Date Value Ref Range Status   04/26/2016 192 (H) <170 mg/dL Final     Comment:     Borderline high:  170-199 mg/dl   High:            >199 mg/dl       Albumin Urine mg/L   Date Value Ref Range Status   04/26/2016 15 mg/L Final     Triglycerides   Date Value Ref Range Status   04/26/2016 160 (H) <90 mg/dL Final     Comment:     Borderline high:   mg/dl   High:            >129 mg/dl       HDL Cholesterol   Date Value Ref Range Status   04/26/2016 50 >45 mg/dL Final     LDL Cholesterol  Calculated   Date Value Ref Range Status   04/26/2016 110 (H) <110 mg/dL Final     Comment:     Borderline high:  110-129 mg/dl   High:            >129 mg/dl       Cholesterol/HDL Ratio   Date Value Ref Range Status   04/10/2015 3.3 0.0 - 5.0 Final     Non HDL Cholesterol   Date Value Ref Range Status   04/26/2016 142 (H) <120 mg/dL Final     Comment:     Borderline high:  120-144 mg/dl   High:            >144 mg/dl              Assessment and Plan:   Juarez  is a 21 year old male with Type 1 diabetes mellitus with stable glycemic control and a  Nice improvement in his A1c.  Jarred has farily stable days with the majority of his BG levels in the euglycemic range.  I did not see any opportunities to make additional adjustments today but do feel he would benefit tremendously by premeal bolusing to make up that last 0.5% or so of his A1c difference.  I also think he would benefit from CGM device but he has been resistant to this modality.  I did request labs today to follow-up on his lipid panel.    Patient Instructions   No changes to settings for today  Keep working on premeal bolusing! - 10 minutes before eating  Labs today  Orders Placed This Encounter   Procedures     Hemoglobin A1c POCT       Follow-up over thanksgiving or winter break.      Thank you for allowing me to participate in the care of your patient.  Please do not hesitate to call Dr Escalante with questions or concerns.    Sincerely,    Angel Escalante MD    Pager [phone]        CC  Patient Care Team:  Markell Zacarias MD as PCP - General (Pediatrics)  MARKELL ZACARIAS    Copy to patient  Tereza Felton   [address] [facility] [id number]

## 2017-08-22 NOTE — PATIENT INSTRUCTIONS
No changes to settings for today  Keep working on premeal bolusing! - 10 minutes before eating  Labs today  Follow-up over thanksgiving or winter break.

## 2017-11-13 DIAGNOSIS — E10.9 TYPE 1 DIABETES MELLITUS WITHOUT COMPLICATION (H): ICD-10-CM

## 2017-11-13 RX ORDER — BLOOD SUGAR DIAGNOSTIC
STRIP MISCELLANEOUS
Qty: 300 EACH | Refills: 11 | Status: SHIPPED | OUTPATIENT
Start: 2017-11-13 | End: 2018-07-03

## 2018-01-02 ENCOUNTER — OFFICE VISIT (OUTPATIENT)
Dept: PEDIATRICS | Facility: CLINIC | Age: 23
End: 2018-01-02
Attending: PEDIATRICS
Payer: COMMERCIAL

## 2018-01-02 VITALS
HEART RATE: 93 BPM | WEIGHT: 124.12 LBS | HEIGHT: 66 IN | DIASTOLIC BLOOD PRESSURE: 70 MMHG | SYSTOLIC BLOOD PRESSURE: 107 MMHG | BODY MASS INDEX: 19.95 KG/M2

## 2018-01-02 DIAGNOSIS — E10.65 TYPE 1 DIABETES MELLITUS WITH HYPERGLYCEMIA (H): Primary | ICD-10-CM

## 2018-01-02 LAB — HBA1C MFR BLD: 8.4 % (ref 0–5.7)

## 2018-01-02 PROCEDURE — 83036 HEMOGLOBIN GLYCOSYLATED A1C: CPT | Mod: ZF | Performed by: PEDIATRICS

## 2018-01-02 PROCEDURE — G0463 HOSPITAL OUTPT CLINIC VISIT: HCPCS | Mod: ZF

## 2018-01-02 ASSESSMENT — PAIN SCALES - GENERAL: PAINLEVEL: NO PAIN (0)

## 2018-01-02 NOTE — LETTER
1/2/2018      RE: Juarez Pemberton  [address] [facility] [id number]       Pediatric Endocrinology Follow-up Consultation: Diabetes    Patient: Juarez Pemberton MRN# [id number]   YOB: 1995 Age: 22 year old   Date of Visit: 1/2/2018    Dear Dr. Markell Zacarias:    I had the pleasure of seeing your patient, Juarez Pemberton in the Pediatric Endocrinology Clinic, [facility], on 1/2/2018 for a follow-up consultation of type 1 diabetes mellitus.           Problem list:     Patient Active Problem List    Diagnosis Date Noted     Type 1 diabetes mellitus with hyperglycemia (H) 02/21/2012     Priority: Medium     Problem list name updated by automated process. Provider to review              HPI:   Juarez is a 22 year old male with Type 1 diabetes mellitus, who was unaccompanied to this visit.  He was last seen in clinic on 8/22/2017. We did not make changes at his last visit, instead focusing on the importance of pre-meal bolusing.   Unfortunately he has not been keen on the use of CGM.  He has not made changes since our last visit together.    He has had recent cold but otherwise no new medical problems.  Feels like his settings work well when his schedule is stable.  If he stays up late, he will tend to run higher.  Lows tend to occur more often when he has more activities/badmitton.   He is getting more consistent with premeal bolusing, especially in the mornings.  He is using temp basal rate of about 50% for duration of activity and an additional 30-60 minutes afterwards.      Today's concerns include: none    Hypoglycemia: Juarez is having 2 hypoglycemic readings per week - typically related to activity - running or biking    Hyperglycemia: evenigns  DKA: none  Elevated BG values tend to occur evenings    Exercise:     Blood Glucose Data:   Overall average: 196 mg/dL, SD 75  Breakfast: 149 mg/dL  Lunch: 158 mg/dL  Dinner: 225  mg/dL  Bedtime: 261mg/dL  BG checks/day: 3.5       A1c:  Today s hemoglobin A1c: 8.4  Previous two HbA1c results:   Lab Results   Component Value Date    A1C 8.2 08/22/2017    A1C 8.9 05/16/2017    A1C 8.6 12/27/2016    A1C 8.5 08/23/2016    A1C 8.6 04/26/2016         Result was discussed at today's visit.     Current insulin regimen:    Omnipod   Basal rates:   12 AM: 1  7 AM: 1.1  9PM: 1.0    Carb ratio   12 AM: 10    Correction factor   12 AM: 45  7 AM: 40  8PM: 45    Target Blood Glucose   12A target and threshold 130  7A target 80, threshold 130   9A target 110 threshold 130    Insulin administration site(s): abdomen, arms - changing every 3-4 days    Total insulin 49.8 (increase by 4 units)  Basal 48% (decrease by 2%)  About 4 boluses on average    I reviewed new history from the patient and the medical record.  I have reviewed previous lab results and records, patient BMI and the growth chart at today's visit.  I have reviewed the pump download,  glucometer download, .    History was obtained from patient.          Social History:     Social History     Social History Narrative    Starting college this fall at [employer] in [place].     [employer] in [place] - 2nd year         Family History:   No family history on file.    Family history was reviewed and is unchanged. Refer to the initial note.         Allergies:   No Known Allergies          Medications:     Current Outpatient Prescriptions   Medication Sig Dispense Refill     insulin glulisine (APIDRA VIAL) 100 UNIT/ML injection Using up to 75 Units/day 70 mL 4     FREESTYLE TEST STRIPS test strip Patient tests up to 10 times/day 300 each 11     acetone, Urine, test (KETOSTIX) STRP Patient to test when blood glucose is >250x2 or when sick and vomiting 50 each 3     glucagon (GLUCAGON EMERGENCY) 1 MG injection Inject 1 mg intramuscular for unconscious hypoglycemia. 1 each 4     insulin detemir (LEVEMIR VIAL) 100 UNIT/ML VIAL 25 Units  "once daily when off pump 10 mL 6     Cromolyn Sodium POWD Please compound in petrolatum solvent to make 4% topical cream. Patient is to apply cream to old areas of lipoatrophy twice daily 500 g 6     insulin detemir (LEVEMIR) 100 UNIT/ML injection Inject  Subcutaneous See Admin Instructions.               Review of Systems:   GENERAL:  Had a good energy level and appetite and is sleeping well.  EYE: No visual disturbance.  ENT: No hearing loss.  No nasal discharge.  No sore throat.  RESPIRATORY: No cough or wheezing  CARDIO: No chest pain. No palpitations.  No rapid heart rate. No hypertension.  GASTROINTESTINAL: No recent vomiting or diarrhea. No constipation. No abdominal pain.  HEMATOLOGIC: No bleeding disorders. No amemia.  GENITOURINARY: No dysuria or hematuria.  MUSCOLOSKELETAL: No joint pain. No muscular weakness.  PSYCHIATRIC: No significant sadness or irritability. No behavior concerns.  NEURO: No seizures.  No headaches. No focal deficits noted.  SKIN: No rashes or skin changes.  ENDOCRINE: see HPI         Physical Exam:   Blood pressure 107/70, pulse 93, height 1.666 m (5' 5.59\"), weight 56.3 kg (124 lb 1.9 oz).  Normalized stature-for-age data not available for patients older than 20 years.  Height: 5' 5.591\", Normalized stature-for-age data not available for patients older than 20 years.  Weight: 124 lbs 1.9 oz, Normalized weight-for-age data not available for patients older than 20 years.  BMI: Body mass index is 20.28 kg/(m^2)., Normalized BMI data available only for age 0 to 20 years.      CONSTITUTIONAL:   Awake, alert, and in no apparent distress.  HEAD: Normocephalic, without obvious abnormality.  EYES: Lids and lashes normal, sclera clear, conjunctiva normal.  ENT: external ears without lesions, nares clear, oral pharynx with moist mucus membranes.  NECK: Supple, symmetrical, trachea midline.  THYROID: symmetric, not enlarged and no tenderness.  HEMATOLOGIC/LYMPHATIC: No cervical " lymphadenopathy.  LUNGS: No increased work of breathing, clear to auscultation bilaterally with good air entry.  CARDIOVASCULAR: Regular rate and rhythm, no murmurs.  ABDOMEN: Normal bowel sounds, soft, non-distended, non-tender, no masses palpated, no hepatosplenomegally.  NEUROLOGIC:No focal deficits noted. Reflexes were symmetric at patella bilaterally.  PSYCHIATRIC: Cooperative, no agitation.  SKIN: Insulin administration sites intact without lipohypertrophy. No acanthosis nigricans.  Omnipod in place on right upper extremity.   MUSCULOSKELETAL: There is no redness, warmth, or swelling of the joints.  Full range of motion noted.  Motor strength and tone are normal.        Health Maintenance:   Last yearly labs: 8/17  Last dental exam: 8/17  [facility]/17 [facility]  Last influenza vaccine: [id number] season  Blood pressure IS consistently <130/80 or below the 90th percentile for age, sex, and height whichever is lower.   Severe hypoglycemia since last visit: None   DKA episodes since last visit: None  Today's PHQ-2 Score: 0        Laboratory results:     Hemoglobin A1C   Date Value Ref Range Status   01/02/2018 8.4 % Final     TSH   Date Value Ref Range Status   08/22/2017 0.88 0.40 - 4.00 mU/L Final     T4 Free   Date Value Ref Range Status   11/01/2011 1.04 0.70 - 1.85 ng/dL Final     Tissue Transglutaminase Antibody IgA   Date Value Ref Range Status   04/26/2016 1 <7 U/mL Final     Comment:     Negative     Tissue Transglutaminase Pamela IgG   Date Value Ref Range Status   04/26/2016 1 <7 U/mL Final     Comment:     Negative     Cholesterol   Date Value Ref Range Status   08/22/2017 211 (H) <170 mg/dL Final     Comment:     Borderline high:  170-199 mg/dl  High:            >199 mg/dl       Albumin Urine mg/L   Date Value Ref Range Status   08/22/2017 13 mg/L Final     Triglycerides   Date Value Ref Range Status   08/22/2017 60 <90 mg/dL Final     HDL Cholesterol   Date Value Ref Range Status    08/22/2017 62 >45 mg/dL Final     LDL Cholesterol Calculated   Date Value Ref Range Status   08/22/2017 137 (H) <110 mg/dL Final     Comment:     Borderline high:  110-129 mg/dl  High:            >129 mg/dl       Cholesterol/HDL Ratio   Date Value Ref Range Status   04/10/2015 3.3 0.0 - 5.0 Final     Non HDL Cholesterol   Date Value Ref Range Status   08/22/2017 149 (H) <120 mg/dL Final     Comment:     Borderline high:  120-144 mg/dl  High:            >144 mg/dl              Assessment and Plan:   Juarez  is a 22 year old male with Type 1 diabetes mellitus.  I feel that Jarred has just not been able to tighten things up as much as he needs.  I am pleased he is showing a degree of independence with his diabetes, but he has not been below an 8% A1c for a while.  I have solorio a number of tweaks to his basal rates that should tighten him up a bit further and we discussed the target BG numbers he should be trying to achieve.  I think he would really benefit from CGM so we had a discussion about this monitoring modality.    Orders Placed This Encounter   Procedures     Hemoglobin A1c POCT     Patient Instructions   Omnipod   Basal rates:   12 AM: 1  7 AM: 1.1  2 PM: 1.2  9PM: 1.1    Carb ratio   12 AM: 10  3PM: 9  8P 10    Correction factor   12 AM: 50   7 AM: 40  8PM: 45    Keep focusing on premeal bolusing.  Think about Freestyle Eboni as a CGM device  Follow-up over spring break if you are home      Thank you for allowing me to participate in the care of your patient.  Please do not hesitate to call Dr Escalante with questions or concerns.    Sincerely,    Angel Escalante MD    Pager [phone]        CC  Patient Care Team:  Markell Sanchez MD as PCP - General (Pediatrics)  MARKELL SANCHEZ    Copy to patient  Tereza Felton   [address] [facility] [phone]    Angel Escalante MD

## 2018-01-02 NOTE — PATIENT INSTRUCTIONS
Omnipod   Basal rates:   12 AM: 1  7 AM: 1.1  2 PM: 1.2  9PM: 1.1    Carb ratio   12 AM: 10  3PM: 9  8P 10    Correction factor   12 AM: 50   7 AM: 40  8PM: 45    Keep focusing on premeal bolusing.  Think about Freestyle Eboni as a CGM device  Follow-up over spring break if you are home

## 2018-01-02 NOTE — PROGRESS NOTES
Pediatric Endocrinology Follow-up Consultation: Diabetes    Patient: Juarez Pemberton MRN# [id number]   YOB: 1995 Age: 22 year old   Date of Visit: 1/2/2018    Dear Dr. Markell Zacarias:    I had the pleasure of seeing your patient, Juarez Pemberton in the Pediatric Endocrinology Clinic, [facility], on 1/2/2018 for a follow-up consultation of type 1 diabetes mellitus.           Problem list:     Patient Active Problem List    Diagnosis Date Noted     Type 1 diabetes mellitus with hyperglycemia (H) 02/21/2012     Priority: Medium     Problem list name updated by automated process. Provider to review              HPI:   Juarez is a 22 year old male with Type 1 diabetes mellitus, who was unaccompanied to this visit.  He was last seen in clinic on 8/22/2017. We did not make changes at his last visit, instead focusing on the importance of pre-meal bolusing.   Unfortunately he has not been keen on the use of CGM.  He has not made changes since our last visit together.    He has had recent cold but otherwise no new medical problems.  Feels like his settings work well when his schedule is stable.  If he stays up late, he will tend to run higher.  Lows tend to occur more often when he has more activities/badmitton.   He is getting more consistent with premeal bolusing, especially in the mornings.  He is using temp basal rate of about 50% for duration of activity and an additional 30-60 minutes afterwards.      Today's concerns include: none    Hypoglycemia: Juarez is having 2 hypoglycemic readings per week - typically related to activity - running or biking    Hyperglycemia: evenigns  DKA: none  Elevated BG values tend to occur evenings    Exercise:     Blood Glucose Data:   Overall average: 196 mg/dL, SD 75  Breakfast: 149 mg/dL  Lunch: 158 mg/dL  Dinner: 225 mg/dL  Bedtime: 261mg/dL  BG checks/day: 3.5       A1c:  Today s hemoglobin A1c: 8.4  Previous two HbA1c  results:   Lab Results   Component Value Date    A1C 8.2 08/22/2017    A1C 8.9 05/16/2017    A1C 8.6 12/27/2016    A1C 8.5 08/23/2016    A1C 8.6 04/26/2016         Result was discussed at today's visit.     Current insulin regimen:    Omnipod   Basal rates:   12 AM: 1  7 AM: 1.1  9PM: 1.0    Carb ratio   12 AM: 10    Correction factor   12 AM: 45  7 AM: 40  8PM: 45    Target Blood Glucose   12A target and threshold 130  7A target 80, threshold 130   9A target 110 threshold 130    Insulin administration site(s): abdomen, arms - changing every 3-4 days    Total insulin 49.8 (increase by 4 units)  Basal 48% (decrease by 2%)  About 4 boluses on average    I reviewed new history from the patient and the medical record.  I have reviewed previous lab results and records, patient BMI and the growth chart at today's visit.  I have reviewed the pump download,  glucometer download, .    History was obtained from patient.          Social History:     Social History     Social History Narrative    Starting college this fall at [employer] in [place].     [employer] in [place] - 2nd year         Family History:   No family history on file.    Family history was reviewed and is unchanged. Refer to the initial note.         Allergies:   No Known Allergies          Medications:     Current Outpatient Prescriptions   Medication Sig Dispense Refill     insulin glulisine (APIDRA VIAL) 100 UNIT/ML injection Using up to 75 Units/day 70 mL 4     FREESTYLE TEST STRIPS test strip Patient tests up to 10 times/day 300 each 11     acetone, Urine, test (KETOSTIX) STRP Patient to test when blood glucose is >250x2 or when sick and vomiting 50 each 3     glucagon (GLUCAGON EMERGENCY) 1 MG injection Inject 1 mg intramuscular for unconscious hypoglycemia. 1 each 4     insulin detemir (LEVEMIR VIAL) 100 UNIT/ML VIAL 25 Units once daily when off pump 10 mL 6     Cromolyn Sodium POWD Please compound in petrolatum solvent to make 4%  "topical cream. Patient is to apply cream to old areas of lipoatrophy twice daily 500 g 6     insulin detemir (LEVEMIR) 100 UNIT/ML injection Inject  Subcutaneous See Admin Instructions.               Review of Systems:   GENERAL:  Had a good energy level and appetite and is sleeping well.  EYE: No visual disturbance.  ENT: No hearing loss.  No nasal discharge.  No sore throat.  RESPIRATORY: No cough or wheezing  CARDIO: No chest pain. No palpitations.  No rapid heart rate. No hypertension.  GASTROINTESTINAL: No recent vomiting or diarrhea. No constipation. No abdominal pain.  HEMATOLOGIC: No bleeding disorders. No amemia.  GENITOURINARY: No dysuria or hematuria.  MUSCOLOSKELETAL: No joint pain. No muscular weakness.  PSYCHIATRIC: No significant sadness or irritability. No behavior concerns.  NEURO: No seizures.  No headaches. No focal deficits noted.  SKIN: No rashes or skin changes.  ENDOCRINE: see HPI         Physical Exam:   Blood pressure 107/70, pulse 93, height 1.666 m (5' 5.59\"), weight 56.3 kg (124 lb 1.9 oz).  Normalized stature-for-age data not available for patients older than 20 years.  Height: 5' 5.591\", Normalized stature-for-age data not available for patients older than 20 years.  Weight: 124 lbs 1.9 oz, Normalized weight-for-age data not available for patients older than 20 years.  BMI: Body mass index is 20.28 kg/(m^2)., Normalized BMI data available only for age 0 to 20 years.      CONSTITUTIONAL:   Awake, alert, and in no apparent distress.  HEAD: Normocephalic, without obvious abnormality.  EYES: Lids and lashes normal, sclera clear, conjunctiva normal.  ENT: external ears without lesions, nares clear, oral pharynx with moist mucus membranes.  NECK: Supple, symmetrical, trachea midline.  THYROID: symmetric, not enlarged and no tenderness.  HEMATOLOGIC/LYMPHATIC: No cervical lymphadenopathy.  LUNGS: No increased work of breathing, clear to auscultation bilaterally with good air " entry.  CARDIOVASCULAR: Regular rate and rhythm, no murmurs.  ABDOMEN: Normal bowel sounds, soft, non-distended, non-tender, no masses palpated, no hepatosplenomegally.  NEUROLOGIC:No focal deficits noted. Reflexes were symmetric at patella bilaterally.  PSYCHIATRIC: Cooperative, no agitation.  SKIN: Insulin administration sites intact without lipohypertrophy. No acanthosis nigricans.  Omnipod in place on right upper extremity.   MUSCULOSKELETAL: There is no redness, warmth, or swelling of the joints.  Full range of motion noted.  Motor strength and tone are normal.        Health Maintenance:   Last yearly labs: 8/17  Last dental exam: 8/17  Ophtho:1/17 - [facility]  Last influenza vaccine: 8436-0325 season  Blood pressure IS consistently <130/80 or below the 90th percentile for age, sex, and height whichever is lower.   Severe hypoglycemia since last visit: None   DKA episodes since last visit: None  Today's PHQ-2 Score: 0        Laboratory results:     Hemoglobin A1C   Date Value Ref Range Status   01/02/2018 8.4 % Final     TSH   Date Value Ref Range Status   08/22/2017 0.88 0.40 - 4.00 mU/L Final     T4 Free   Date Value Ref Range Status   11/01/2011 1.04 0.70 - 1.85 ng/dL Final     Tissue Transglutaminase Antibody IgA   Date Value Ref Range Status   04/26/2016 1 <7 U/mL Final     Comment:     Negative     Tissue Transglutaminase Pamela IgG   Date Value Ref Range Status   04/26/2016 1 <7 U/mL Final     Comment:     Negative     Cholesterol   Date Value Ref Range Status   08/22/2017 211 (H) <170 mg/dL Final     Comment:     Borderline high:  170-199 mg/dl  High:            >199 mg/dl       Albumin Urine mg/L   Date Value Ref Range Status   08/22/2017 13 mg/L Final     Triglycerides   Date Value Ref Range Status   08/22/2017 60 <90 mg/dL Final     HDL Cholesterol   Date Value Ref Range Status   08/22/2017 62 >45 mg/dL Final     LDL Cholesterol Calculated   Date Value Ref Range Status   08/22/2017 137 (H)  <110 mg/dL Final     Comment:     Borderline high:  110-129 mg/dl  High:            >129 mg/dl       Cholesterol/HDL Ratio   Date Value Ref Range Status   04/10/2015 3.3 0.0 - 5.0 Final     Non HDL Cholesterol   Date Value Ref Range Status   08/22/2017 149 (H) <120 mg/dL Final     Comment:     Borderline high:  120-144 mg/dl  High:            >144 mg/dl              Assessment and Plan:   Juarez  is a 22 year old male with Type 1 diabetes mellitus.  I feel that Jarred has just not been able to tighten things up as much as he needs.  I am pleased he is showing a degree of independence with his diabetes, but he has not been below an 8% A1c for a while.  I have solorio a number of tweaks to his basal rates that should tighten him up a bit further and we discussed the target BG numbers he should be trying to achieve.  I think he would really benefit from CGM so we had a discussion about this monitoring modality.    Orders Placed This Encounter   Procedures     Hemoglobin A1c POCT     Patient Instructions   Omnipod   Basal rates:   12 AM: 1  7 AM: 1.1  2 PM: 1.2  9PM: 1.1    Carb ratio   12 AM: 10  3PM: 9  8P 10    Correction factor   12 AM: 50   7 AM: 40  8PM: 45    Keep focusing on premeal bolusing.  Think about Freestyle Eboni as a CGM device  Follow-up over spring break if you are home      Thank you for allowing me to participate in the care of your patient.  Please do not hesitate to call Dr Escalante with questions or concerns.    Sincerely,    Angel Escalante MD    Pager [phone]        CC  Patient Care Team:  Markell Sanchez MD as PCP - General (Pediatrics)  MARKELL SANCHEZ    Copy to patient  Tereza Felton   [address] [place] [address]

## 2018-01-02 NOTE — MR AVS SNAPSHOT
"              After Visit Summary   1/2/2018    Juarez Pemberton    MRN: [id number]           Patient Information     Date Of Birth          1995        Visit Information        Provider Department      1/2/2018 9:00 AM [facility]        Today's Diagnoses     Type 1 diabetes mellitus with hyperglycemia (H)    -  1      Care Instructions    Omnipod   Basal rates:   12 AM: 1  7 AM: 1.1  2 PM: 1.2  9PM: 1.1    Carb ratio   12 AM: 10  3PM: 9  8P 10    Correction factor   12 AM: 50   7 AM: 40  8PM: 45    Keep focusing on premeal bolusing.  Think about Freestyle Eboni as a CGM device  Follow-up over spring break if you are home          Follow-ups after your visit        Who to contact     If you have questions or need follow up information about today's clinic visit or your schedule please contact [facility] directly at [phone].  Normal or non-critical lab and imaging results will be communicated to you by [facility] ([facility] [facility])hart, letter or phone within 4 business days after the clinic has received the results. If you do not hear from us within 7 days, please contact the clinic through [employer] or phone. If you have a critical or abnormal lab result, we will notify you by phone as soon as possible.  Submit refill requests through [employer] or call your pharmacy and they will forward the refill request to us. Please allow 3 business days for your refill to be completed.          Additional Information About Your Visit        [facility] ([facility]hart Information     [employer] lets you send messages to your doctor, view your test results, renew your prescriptions, schedule appointments and more. To sign up, go to [email or web address] . Click on \"Log in\" on the left side of the screen, which will take you to the Welcome page. Then click on \"Sign up Now\" on the right side of the page.     You will be asked to enter the access code listed below, as well as " "some personal information. Please follow the directions to create your username and password.     Your access code is: [email or web address]  Expires: 2018  9:34 AM     Your access code will  in 90 days. If you need help or a new code, please call your [place] clinic or [phone].        Care EveryWhere ID     This is your Care EveryWhere ID. This could be used by other organizations to access your [place] medical records  [id number]        Your Vitals Were     Pulse Height BMI (Body Mass Index)             93 1.666 m (5' 5.59\") 20.28 kg/m2          Blood Pressure from Last 3 Encounters:   18 107/70   17 117/73   17 105/66    Weight from Last 3 Encounters:   18 56.3 kg (124 lb 1.9 oz)   17 57.3 kg (126 lb 5.2 oz) (8 %)*   17 54.9 kg (121 lb 0.5 oz) (4 %)*     * Growth percentiles are based on [facility] 2-20 Years data.              We Performed the Following     Hemoglobin A1c POCT        Primary Care Provider Office Phone # Fax #    Markell Zacarias [phone]       [facility] [address] [place] [address]        Equal Access to Services     [facility]: Hadii sabine vasquez hadasho Soluh, waaxda luqadaha, qaybta kaalmada reid, christian cornelius. [facility] [phone].    ATENCIÓN: Si habla español, tiene a cadet disposición servicios gratuitos de asistencia lingüística. Llame al [phone].    We comply with applicable federal civil rights laws and [place] laws. We do not discriminate on the basis of race, color, national origin, age, disability, sex, sexual orientation, or gender identity.            Thank you!     Thank you for choosing [facility]  for your care. Our goal is always to provide you with excellent care. Hearing back from our patients is one way we can continue to improve our services. Please take a few minutes to complete the written survey that you may " receive in the mail after your visit with us. Thank you!             Your Updated Medication List - Protect others around you: Learn how to safely use, store and throw away your medicines at www.disposemymeds.org.          This list is accurate as of: 1/2/18  9:34 AM.  Always use your most recent med list.                   Brand Name Dispense Instructions for use Diagnosis    acetone (Urine) test Strp    KETOSTIX    50 each    Patient to test when blood glucose is >250x2 or when sick and vomiting    Type 1 diabetes mellitus without complication (H)       Cromolyn Sodium Powd     500 g    Please compound in petrolatum solvent to make 4% topical cream. Patient is to apply cream to old areas of lipoatrophy twice daily    Type I (juvenile type) diabetes mellitus without mention of complication, not stated as uncontrolled       FREESTYLE TEST STRIPS test strip   Generic drug:  blood glucose monitoring     300 each    Patient tests up to 10 times/day    Type 1 diabetes mellitus without complication (H)       glucagon 1 MG kit    GLUCAGON EMERGENCY    1 each    Inject 1 mg intramuscular for unconscious hypoglycemia.    Type 1 diabetes mellitus without complication (H)       insulin glulisine 100 UNIT/ML injection    APIDRA VIAL    70 mL    Using up to 75 Units/day    Type 1 diabetes mellitus without complication (H)       * insulin detemir 100 UNIT/ML injection    LEVEMIR VIAL    10 mL    25 Units once daily when off pump    Type 1 diabetes mellitus without complication (H)       * LEVEMIR VIAL 100 UNIT/ML injection   Generic drug:  insulin detemir      Inject  Subcutaneous See Admin Instructions.    Diabetes mellitus, type 1       * Notice:  This list has 2 medication(s) that are the same as other medications prescribed for you. Read the directions carefully, and ask your doctor or other care provider to review them with you.

## 2018-01-02 NOTE — NURSING NOTE
"Informant-    Juarez is accompanied by mother    Reason for Visit-  Diabetes     Vitals signs-  /70  Pulse 93  Ht 1.666 m (5' 5.59\")  Wt 56.3 kg (124 lb 1.9 oz)  BMI 20.28 kg/m2    There are concerns about the child's exposure to violence in the home: No    Face to Face time: 5 minutes  Eli Rodriguez MA      "

## 2018-04-27 ENCOUNTER — TELEPHONE (OUTPATIENT)
Dept: PEDIATRICS | Facility: CLINIC | Age: 23
End: 2018-04-27

## 2018-04-27 DIAGNOSIS — E10.9 TYPE 1 DIABETES MELLITUS WITHOUT COMPLICATION (H): ICD-10-CM

## 2018-04-27 NOTE — TELEPHONE ENCOUNTER
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  Apidra has worked to not cause this issue.     If you received a fax notification from an outside Pharmacy:  Pharmacy Name: [facility]  Pharmacy #: [phone]   Pharmacy Fax: [phone]

## 2018-04-30 NOTE — TELEPHONE ENCOUNTER
Central Prior Authorization Team   Phone: [phone]      PA Initiation    Medication: Apidra U100 insulin-using up to 75 units daily (90 day supply)-PA initiated  Insurance Company: [employer] - Phone [phone] Fax [phone]  Pharmacy Filling the Rx: [facility] [place]  Filling Pharmacy Phone: [phone]  Filling Pharmacy Fax:    Start Date: 4/30/2018

## 2018-05-01 NOTE — TELEPHONE ENCOUNTER
Prior Authorization Approval    Authorization Effective Date: 5/1/2018  Authorization Expiration Date: 5/1/2019  Medication: Apidra U100 insulin-using up to 75 units daily (90 day supply)-PA Approved  Approved Dose/Quantity:    Reference #:     Insurance Company: [employer] - Phone [phone] Fax [phone]  Expected CoPay:       CoPay Card Available:      Foundation Assistance Needed:    Which Pharmacy is filling the prescription (Not needed for infusion/clinic administered): [facility] [place]  Pharmacy Notified:    Patient Notified:

## 2018-07-03 ENCOUNTER — OFFICE VISIT (OUTPATIENT)
Dept: PEDIATRICS | Facility: CLINIC | Age: 23
End: 2018-07-03
Attending: PEDIATRICS
Payer: COMMERCIAL

## 2018-07-03 VITALS
DIASTOLIC BLOOD PRESSURE: 73 MMHG | SYSTOLIC BLOOD PRESSURE: 117 MMHG | BODY MASS INDEX: 20.05 KG/M2 | HEART RATE: 75 BPM | HEIGHT: 66 IN | WEIGHT: 124.78 LBS

## 2018-07-03 DIAGNOSIS — E10.9 TYPE 1 DIABETES MELLITUS WITHOUT COMPLICATION (H): ICD-10-CM

## 2018-07-03 DIAGNOSIS — E10.65 TYPE 1 DIABETES MELLITUS WITH HYPERGLYCEMIA (H): Primary | ICD-10-CM

## 2018-07-03 LAB — HBA1C MFR BLD: 7.7 % (ref 0–5.7)

## 2018-07-03 PROCEDURE — G0463 HOSPITAL OUTPT CLINIC VISIT: HCPCS | Mod: ZF

## 2018-07-03 PROCEDURE — 83036 HEMOGLOBIN GLYCOSYLATED A1C: CPT | Mod: ZF | Performed by: PEDIATRICS

## 2018-07-03 RX ORDER — BLOOD SUGAR DIAGNOSTIC
STRIP MISCELLANEOUS
Qty: 300 EACH | Refills: 11 | Status: SHIPPED | OUTPATIENT
Start: 2018-07-03 | End: 2019-07-12

## 2018-07-03 ASSESSMENT — PAIN SCALES - GENERAL: PAINLEVEL: NO PAIN (0)

## 2018-07-03 NOTE — LETTER
7/3/2018      RE: Juarez Pemberton  [address] [facility] [address]       Pediatric Endocrinology Follow-up Consultation: Diabetes    Patient: Juarez Pemberton MRN# [id number]   YOB: 1995 Age: 22 year old   Date of Visit: 7/3/2018    Dear Dr. Markell Zacarias:    I had the pleasure of seeing your patient, Juarez Pemberton in the Pediatric Endocrinology Clinic, [facility], on 7/3/2018 for a follow-up consultation of type 1 diabetes mellitus.           Problem list:     Patient Active Problem List    Diagnosis Date Noted     Type 1 diabetes mellitus with hyperglycemia (H) 02/21/2012     Priority: Medium     Problem list name updated by automated process. Provider to review              HPI:   Juarez is a 22 year old male with Type 1 diabetes mellitus, who was unaccompanied to this visit.  He was last seen in clinic on 1/2/2018.  I made some additional increases at last visit.  I stressed importance of premeal bolusing and again recommended that he start on CGM.  He decreased overnight basal insulin since last visit but has not made additional changes.    Feels like spring has resulted in lower numbers due to activity.  Has tried to wear pods in stomach due to lumpiness in arms but is having debilitating pain on the third day - feeling it midline in abdomen and into umbilicus. Will cut back on meal dose when he is going biking or rollerblading.   He is getting more consistent with premeal bolusing, especially in the mornings and lunch.  Tends to bolus afterwards at dinner.  He is using temp basal rate on occasion with activity.       Today's concerns include: stomach sites    Hypoglycemia: Juarez is having 3-4 hypoglycemic readings per week - typically related to activity - running or biking.  Symptoms in 50 range generally    Hyperglycemia: rare  DKA: none  Elevated BG values tend to occur not consistently    Exercise:  rollerblading    Blood Glucose Data:   Overall average: 148 mg/dL, SD 88  Breakfast: 151 mg/dL  Lunch: 122 mg/dL  Dinner: 152 mg/dL  Bedtime: 164mg/dL  BG checks/day: 4.9       A1c:  Today s hemoglobin A1c: 7.7  Previous two HbA1c results:   Lab Results   Component Value Date    A1C 7.7 07/03/2018    A1C 8.4 01/02/2018    A1C 8.2 08/22/2017    A1C 8.9 05/16/2017    A1C 8.6 12/27/2016     Result was discussed at today's visit.     Current insulin regimen:    Omnipod   Basal rates:   12 AM: 0.9  7 AM: 1.1  2 PM: 1.2  9PM: 1.1    Carb ratio   12 AM: 10  3PM: 9  8P 10    Correction factor   12 AM: 50   7 AM: 40  8PM: 45    Target Blood Glucose   12A target and threshold 130  7A target 80, threshold 130   9A target 110 threshold 130    Insulin administration site(s): abdomen, arms - changing every 3-4 days    Total insulin 46.6 (decrease by 2 units)  Basal 50% (increase by 2%)  About 4.4 boluses on average    I reviewed new history from the patient and the medical record.  I have reviewed previous lab results and records, patient BMI and the growth chart at today's visit.  I have reviewed the pump download,  glucometer download, .    History was obtained from patient.          Social History:     Social History     Social History Narrative    Starting college this fall at [employer] in [place].     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    Family history was reviewed and is unchanged. Refer to the initial note.         Allergies:   No Known Allergies          Medications:     Current Outpatient Prescriptions   Medication Sig Dispense Refill     acetone, Urine, test (KETOSTIX) STRP Patient to test when blood glucose is >250x2 or when sick and vomiting 50 each 3     Cromolyn Sodium POWD Please compound in petrolatum solvent to make 4% topical cream. Patient is to apply cream to old  "areas of lipoatrophy twice daily 500 g 6     FREESTYLE TEST STRIPS test strip Patient tests up to 10 times/day 300 each 11     glucagon (GLUCAGON EMERGENCY) 1 MG injection Inject 1 mg intramuscular for unconscious hypoglycemia. 1 each 4     insulin detemir (LEVEMIR VIAL) 100 UNIT/ML VIAL 25 Units once daily when off pump 10 mL 6     insulin detemir (LEVEMIR) 100 UNIT/ML injection Inject  Subcutaneous See Admin Instructions.       insulin glulisine (APIDRA VIAL) 100 UNIT/ML injection Using up to 75 Units/day 70 mL 3             Review of Systems:   GENERAL:  Had a good energy level and appetite and is sleeping well.  EYE: No visual disturbance.  ENT: No hearing loss.  No nasal discharge.  No sore throat.  RESPIRATORY: No cough or wheezing  CARDIO: No chest pain. No palpitations.  No rapid heart rate. No hypertension.  GASTROINTESTINAL: No recent vomiting or diarrhea. No constipation. No abdominal pain.  HEMATOLOGIC: No bleeding disorders. No amemia.  GENITOURINARY: No dysuria or hematuria.  MUSCOLOSKELETAL: No joint pain. No muscular weakness.  PSYCHIATRIC: No significant sadness or irritability. No behavior concerns.  NEURO: No seizures.  No headaches. No focal deficits noted.  SKIN: No rashes or skin changes.  ENDOCRINE: see HPI         Physical Exam:   Blood pressure 117/73, pulse 75, height 1.664 m (5' 5.51\"), weight 56.6 kg (124 lb 12.5 oz).  Normalized stature-for-age data not available for patients older than 20 years.  Height: 5' 5.512\", Normalized stature-for-age data not available for patients older than 20 years.  Weight: 124 lbs 12.49 oz, Normalized weight-for-age data not available for patients older than 20 years.  BMI: Body mass index is 20.44 kg/(m^2)., Normalized BMI data available only for age 0 to 20 years.      CONSTITUTIONAL:   Awake, alert, and in no apparent distress.  HEAD: Normocephalic, without obvious abnormality.  EYES: Lids and lashes normal, sclera clear, conjunctiva normal.  ENT: " external ears without lesions, nares clear, oral pharynx with moist mucus membranes.  NECK: Supple, symmetrical, trachea midline.  THYROID: symmetric, not enlarged and no tenderness.  HEMATOLOGIC/LYMPHATIC: No cervical lymphadenopathy.  LUNGS: No increased work of breathing, clear to auscultation bilaterally with good air entry.  CARDIOVASCULAR: Regular rate and rhythm, no murmurs.  ABDOMEN: Normal bowel sounds, soft, non-distended, non-tender, no masses palpated, no hepatosplenomegally.  NEUROLOGIC:No focal deficits noted. Reflexes were symmetric at patella bilaterally.  PSYCHIATRIC: Cooperative, no agitation.  SKIN: Insulin administration sites intact with minimal LH on arms.  No acanthosis nigricans.  Omnipod in place on right upper extremity.   MUSCULOSKELETAL: There is no redness, warmth, or swelling of the joints.  Full range of motion noted.  Motor strength and tone are normal.        Health Maintenance:   Last yearly labs: 8/17  Last dental exam: 5/18  Ophtho:1/18 - [facility]  Last influenza vaccine: [id number] season  Blood pressure IS consistently <130/80 or below the 90th percentile for age, sex, and height whichever is lower.   Severe hypoglycemia since last visit: None   DKA episodes since last visit: None  Today's PHQ-2 Score: 0        Laboratory results:     Hemoglobin A1C   Date Value Ref Range Status   01/02/2018 8.4 % Final     TSH   Date Value Ref Range Status   08/22/2017 0.88 0.40 - 4.00 mU/L Final     T4 Free   Date Value Ref Range Status   11/01/2011 1.04 0.70 - 1.85 ng/dL Final     Tissue Transglutaminase Antibody IgA   Date Value Ref Range Status   04/26/2016 1 <7 U/mL Final     Comment:     Negative     Tissue Transglutaminase Pamela IgG   Date Value Ref Range Status   04/26/2016 1 <7 U/mL Final     Comment:     Negative     Cholesterol   Date Value Ref Range Status   08/22/2017 211 (H) <170 mg/dL Final     Comment:     Borderline high:  170-199 mg/dl  High:            >199 mg/dl        Albumin Urine mg/L   Date Value Ref Range Status   08/22/2017 13 mg/L Final     Triglycerides   Date Value Ref Range Status   08/22/2017 60 <90 mg/dL Final     HDL Cholesterol   Date Value Ref Range Status   08/22/2017 62 >45 mg/dL Final     LDL Cholesterol Calculated   Date Value Ref Range Status   08/22/2017 137 (H) <110 mg/dL Final     Comment:     Borderline high:  110-129 mg/dl  High:            >129 mg/dl       Cholesterol/HDL Ratio   Date Value Ref Range Status   04/10/2015 3.3 0.0 - 5.0 Final     Non HDL Cholesterol   Date Value Ref Range Status   08/22/2017 149 (H) <120 mg/dL Final     Comment:     Borderline high:  120-144 mg/dl  High:            >144 mg/dl              Assessment and Plan:   Juarez  is a 22 year old male with Type 1 diabetes mellitus. Jarred is doing an excellent job with his diabetes cares.  Unfortunately, he is not interested in using a CGM despite our best intentions.  I am concerned with the relative frequency of his lows and the fact that he is not getting symptoms now until he is in the 50s.  I did offer him a trial of the freestyle catherine which he said he would think about.  I made some tweaks to his basal rates during the day and his correction at bedtime. He is up to date on his labs.  His BP is excellent.  I would surmise he is getting some referred discomfort to the umbilical area from a lateral placement of a pod which is unusual but since it was so debilitating for him, I advised him to not use in this area.  There was no evidence for infection.We discussed a transition plan to adult medicine in [place] since his parents have moved from the area.    Orders Placed This Encounter   Procedures     Hemoglobin A1c POCT     Patient Instructions   Omnipod   Basal rates:   12 AM: 0.9  7 AM: 1.05  2 PM: 1.15  9PM: 1.1    Carb ratio   12 AM: 10  3PM: 9  8P 10    Correction factor   12 AM: 50   7 AM: 40  8PM: 55    Target Blood Glucose   12A target and threshold 130  7A target 80,  threshold 130   9A target 110 threshold 130    If you would like to do a test run on the freestyle catherine, contact [facility] and we can get you set up this summer  Stay in touch if you would like to stay with our program.  Otherwise establish care with adult endocrinology in [place] or [place].  Look at [employer] for assistance in finding a new provider.  In the interim, stay in touch with us if you need assistance with adjustments or prescriptions.  Use flanks for pod sites    Thank you for allowing me to participate in the care of your patient.  Please do not hesitate to call Dr Escalante with questions or concerns.    Sincerely,    Angel Escalante MD    Pager [phone]        CC  Patient Care Team:  Markell Zacarias MD as PCP - General (Pediatrics)  MARKELL ZACARIAS    Copy to patient  Tereza Felton   [address] [facility] [address]    Angel Escalante MD

## 2018-07-03 NOTE — NURSING NOTE
"Informant-    Juarez is accompanied by self    Reason for Visit-  diabetes     Vitals signs-  /73  Pulse 75  Ht 1.664 m (5' 5.51\")  Wt 56.6 kg (124 lb 12.5 oz)  BMI 20.44 kg/m2    There are concerns about the child's exposure to violence in the home: No    Face to Face time: 5 minutes  Eli Rodriguez MA      "

## 2018-07-03 NOTE — MR AVS SNAPSHOT
After Visit Summary   7/3/2018    Juarez Pemberton    MRN: [id number]           Patient Information     Date Of Birth          1995        Visit Information        Provider Department      7/3/2018 8:20 AM Angel Escalante MD [facility]        Today's Diagnoses     Type 1 diabetes mellitus with hyperglycemia (H)    -  1    Type 1 diabetes mellitus without complication (H)          Care Instructions    Omnipod   Basal rates:   12 AM: 0.9  7 AM: 1.05  2 PM: 1.15  9PM: 1.1    Carb ratio   12 AM: 10  3PM: 9  8P 10    Correction factor   12 AM: 50   7 AM: 40  8PM: 55    Target Blood Glucose   12A target and threshold 130  7A target 80, threshold 130   9A target 110 threshold 130    If you would like to do a test run on the freestyle catherine, contact [facility] and we can get you set up this summer  Stay in touch if you would like to stay with our program.  Otherwise establish care with adult endocrinology in [place] or [place].  Look at [employer] for assistance in finding a new provider.  In the interim, stay in touch with us if you need assistance with adjustments or prescriptions.          Follow-ups after your visit        Who to contact     If you have questions or need follow up information about today's clinic visit or your schedule please contact [facility] directly at [phone].  Normal or non-critical lab and imaging results will be communicated to you by MyChart, letter or phone within 4 business days after the clinic has received the results. If you do not hear from us within 7 days, please contact the clinic through MyChart or phone. If you have a critical or abnormal lab result, we will notify you by phone as soon as possible.  Submit refill requests through [employer] or call your pharmacy and they will forward the refill request to us. Please allow 3 business days for your refill to be completed.          Additional  "Information About Your Visit        Care EveryWhere ID     This is your Care EveryWhere ID. This could be used by other organizations to access your [facility] medical records  [id number]        Your Vitals Were     Pulse Height BMI (Body Mass Index)             75 1.664 m (5' 5.51\") 20.44 kg/m2          Blood Pressure from Last 3 Encounters:   07/03/18 117/73   01/02/18 107/70   08/22/17 117/73    Weight from Last 3 Encounters:   07/03/18 56.6 kg (124 lb 12.5 oz)   01/02/18 56.3 kg (124 lb 1.9 oz)   08/22/17 57.3 kg (126 lb 5.2 oz) (8 %)*     * Growth percentiles are based on [facility] 2-20 Years data.              We Performed the Following     Hemoglobin A1c POCT          Where to get your medicines      These medications were sent to [facility]/pharmacy #[id number] - [place], [place] - [address]  [address], [facility] [address]     Phone:  [phone]     FREESTYLE TEST STRIPS test strip    glucagon 1 MG kit    insulin glulisine 100 UNIT/ML injection          Primary Care Provider Office Phone # Fax #    Markell Zacarias [phone]       [facility] [address]  [facility] [address]        Equal Access to Services     BEBE FARAH : Hadii sabine vasquez hadasho Soluh, waaxda luqadaha, qaybta kaalmada adeegjimmie, christian cornelius. So [facility] [phone].    ATENCIÓN: Si habla español, tiene a cadet disposición servicios gratuitos de asistencia lingüística. Llame al [phone].    We comply with applicable federal civil rights laws and [place] laws. We do not discriminate on the basis of race, color, national origin, age, disability, sex, sexual orientation, or gender identity.            Thank you!     Thank you for choosing [facility]  for your care. Our goal is always to provide you with excellent care. Hearing back from our patients is one way we can continue to improve our services. Please take a few minutes to " complete the written survey that you may receive in the mail after your visit with us. Thank you!             Your Updated Medication List - Protect others around you: Learn how to safely use, store and throw away your medicines at www.disposemymeds.org.          This list is accurate as of 7/3/18  9:03 AM.  Always use your most recent med list.                   Brand Name Dispense Instructions for use Diagnosis    acetone (Urine) test Strp    KETOSTIX    50 each    Patient to test when blood glucose is >250x2 or when sick and vomiting    Type 1 diabetes mellitus without complication (H)       Cromolyn Sodium Powd     500 g    Please compound in petrolatum solvent to make 4% topical cream. Patient is to apply cream to old areas of lipoatrophy twice daily    Type I (juvenile type) diabetes mellitus without mention of complication, not stated as uncontrolled       FREESTYLE TEST STRIPS test strip   Generic drug:  blood glucose monitoring     300 each    Patient tests up to 10 times/day    Type 1 diabetes mellitus without complication (H)       glucagon 1 MG kit    GLUCAGON EMERGENCY    1 each    Inject 1 mg intramuscular for unconscious hypoglycemia.    Type 1 diabetes mellitus without complication (H)       insulin glulisine 100 UNIT/ML injection    APIDRA VIAL    70 mL    Using up to 75 Units/day    Type 1 diabetes mellitus without complication (H)       * insulin detemir 100 UNIT/ML injection    LEVEMIR VIAL    10 mL    25 Units once daily when off pump    Type 1 diabetes mellitus without complication (H)       * LEVEMIR VIAL 100 UNIT/ML injection   Generic drug:  insulin detemir      Inject  Subcutaneous See Admin Instructions.    Diabetes mellitus, type 1       * Notice:  This list has 2 medication(s) that are the same as other medications prescribed for you. Read the directions carefully, and ask your doctor or other care provider to review them with you.

## 2018-07-03 NOTE — PROGRESS NOTES
Pediatric Endocrinology Follow-up Consultation: Diabetes    Patient: Juarez Pemberton MRN# [id number]   YOB: 1995 Age: 22 year old   Date of Visit: 7/3/2018    Dear Dr. Markell Zacarias:    I had the pleasure of seeing your patient, Juarez Pemberton in the Pediatric Endocrinology Clinic, [facility], on 7/3/2018 for a follow-up consultation of type 1 diabetes mellitus.           Problem list:     Patient Active Problem List    Diagnosis Date Noted     Type 1 diabetes mellitus with hyperglycemia (H) 02/21/2012     Priority: Medium     Problem list name updated by automated process. Provider to review              HPI:   Juarez is a 22 year old male with Type 1 diabetes mellitus, who was unaccompanied to this visit.  He was last seen in clinic on 1/2/2018.  I made some additional increases at last visit.  I stressed importance of premeal bolusing and again recommended that he start on CGM.  He decreased overnight basal insulin since last visit but has not made additional changes.    Feels like spring has resulted in lower numbers due to activity.  Has tried to wear pods in stomach due to lumpiness in arms but is having debilitating pain on the third day - feeling it midline in abdomen and into umbilicus. Will cut back on meal dose when he is going biking or rollerblading.   He is getting more consistent with premeal bolusing, especially in the mornings and lunch.  Tends to bolus afterwards at dinner.  He is using temp basal rate on occasion with activity.       Today's concerns include: stomach sites    Hypoglycemia: Juarez is having 3-4 hypoglycemic readings per week - typically related to activity - running or biking.  Symptoms in 50 range generally    Hyperglycemia: rare  DKA: none  Elevated BG values tend to occur not consistently    Exercise: rollerblading    Blood Glucose Data:   Overall average: 148 mg/dL, SD 88  Breakfast: 151 mg/dL  Lunch: 122  mg/dL  Dinner: 152 mg/dL  Bedtime: 164mg/dL  BG checks/day: 4.9       A1c:  Today s hemoglobin A1c: 7.7  Previous two HbA1c results:   Lab Results   Component Value Date    A1C 7.7 07/03/2018    A1C 8.4 01/02/2018    A1C 8.2 08/22/2017    A1C 8.9 05/16/2017    A1C 8.6 12/27/2016     Result was discussed at today's visit.     Current insulin regimen:    Omnipod   Basal rates:   12 AM: 0.9  7 AM: 1.1  2 PM: 1.2  9PM: 1.1    Carb ratio   12 AM: 10  3PM: 9  8P 10    Correction factor   12 AM: 50   7 AM: 40  8PM: 45    Target Blood Glucose   12A target and threshold 130  7A target 80, threshold 130   9A target 110 threshold 130    Insulin administration site(s): abdomen, arms - changing every 3-4 days    Total insulin 46.6 (decrease by 2 units)  Basal 50% (increase by 2%)  About 4.4 boluses on average    I reviewed new history from the patient and the medical record.  I have reviewed previous lab results and records, patient BMI and the growth chart at today's visit.  I have reviewed the pump download,  glucometer download, .    History was obtained from patient.          Social History:     Social History     Social History Narrative    Starting college this fall at [employer] in [place].     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    Family history was reviewed and is unchanged. Refer to the initial note.         Allergies:   No Known Allergies          Medications:     Current Outpatient Prescriptions   Medication Sig Dispense Refill     acetone, Urine, test (KETOSTIX) STRP Patient to test when blood glucose is >250x2 or when sick and vomiting 50 each 3     Cromolyn Sodium POWD Please compound in petrolatum solvent to make 4% topical cream. Patient is to apply cream to old areas of lipoatrophy twice daily 500 g 6     FREESTYLE TEST STRIPS test strip Patient tests up to 10  "times/day 300 each 11     glucagon (GLUCAGON EMERGENCY) 1 MG injection Inject 1 mg intramuscular for unconscious hypoglycemia. 1 each 4     insulin detemir (LEVEMIR VIAL) 100 UNIT/ML VIAL 25 Units once daily when off pump 10 mL 6     insulin detemir (LEVEMIR) 100 UNIT/ML injection Inject  Subcutaneous See Admin Instructions.       insulin glulisine (APIDRA VIAL) 100 UNIT/ML injection Using up to 75 Units/day 70 mL 3             Review of Systems:   GENERAL:  Had a good energy level and appetite and is sleeping well.  EYE: No visual disturbance.  ENT: No hearing loss.  No nasal discharge.  No sore throat.  RESPIRATORY: No cough or wheezing  CARDIO: No chest pain. No palpitations.  No rapid heart rate. No hypertension.  GASTROINTESTINAL: No recent vomiting or diarrhea. No constipation. No abdominal pain.  HEMATOLOGIC: No bleeding disorders. No amemia.  GENITOURINARY: No dysuria or hematuria.  MUSCOLOSKELETAL: No joint pain. No muscular weakness.  PSYCHIATRIC: No significant sadness or irritability. No behavior concerns.  NEURO: No seizures.  No headaches. No focal deficits noted.  SKIN: No rashes or skin changes.  ENDOCRINE: see HPI         Physical Exam:   Blood pressure 117/73, pulse 75, height 1.664 m (5' 5.51\"), weight 56.6 kg (124 lb 12.5 oz).  Normalized stature-for-age data not available for patients older than 20 years.  Height: 5' 5.512\", Normalized stature-for-age data not available for patients older than 20 years.  Weight: 124 lbs 12.49 oz, Normalized weight-for-age data not available for patients older than 20 years.  BMI: Body mass index is 20.44 kg/(m^2)., Normalized BMI data available only for age 0 to 20 years.      CONSTITUTIONAL:   Awake, alert, and in no apparent distress.  HEAD: Normocephalic, without obvious abnormality.  EYES: Lids and lashes normal, sclera clear, conjunctiva normal.  ENT: external ears without lesions, nares clear, oral pharynx with moist mucus membranes.  NECK: Supple, " symmetrical, trachea midline.  THYROID: symmetric, not enlarged and no tenderness.  HEMATOLOGIC/LYMPHATIC: No cervical lymphadenopathy.  LUNGS: No increased work of breathing, clear to auscultation bilaterally with good air entry.  CARDIOVASCULAR: Regular rate and rhythm, no murmurs.  ABDOMEN: Normal bowel sounds, soft, non-distended, non-tender, no masses palpated, no hepatosplenomegally.  NEUROLOGIC:No focal deficits noted. Reflexes were symmetric at patella bilaterally.  PSYCHIATRIC: Cooperative, no agitation.  SKIN: Insulin administration sites intact with minimal LH on arms.  No acanthosis nigricans.  Omnipod in place on right upper extremity.   MUSCULOSKELETAL: There is no redness, warmth, or swelling of the joints.  Full range of motion noted.  Motor strength and tone are normal.        Health Maintenance:   Last yearly labs: 8/17  Last dental exam: 5/18  Ophtho:1/18 - [facility]  Last influenza vaccine: [id number] season  Blood pressure IS consistently <130/80 or below the 90th percentile for age, sex, and height whichever is lower.   Severe hypoglycemia since last visit: None   DKA episodes since last visit: None  Today's PHQ-2 Score: 0        Laboratory results:     Hemoglobin A1C   Date Value Ref Range Status   01/02/2018 8.4 % Final     TSH   Date Value Ref Range Status   08/22/2017 0.88 0.40 - 4.00 mU/L Final     T4 Free   Date Value Ref Range Status   11/01/2011 1.04 0.70 - 1.85 ng/dL Final     Tissue Transglutaminase Antibody IgA   Date Value Ref Range Status   04/26/2016 1 <7 U/mL Final     Comment:     Negative     Tissue Transglutaminase Pamela IgG   Date Value Ref Range Status   04/26/2016 1 <7 U/mL Final     Comment:     Negative     Cholesterol   Date Value Ref Range Status   08/22/2017 211 (H) <170 mg/dL Final     Comment:     Borderline high:  170-199 mg/dl  High:            >199 mg/dl       Albumin Urine mg/L   Date Value Ref Range Status   08/22/2017 13 mg/L Final     Triglycerides    Date Value Ref Range Status   08/22/2017 60 <90 mg/dL Final     HDL Cholesterol   Date Value Ref Range Status   08/22/2017 62 >45 mg/dL Final     LDL Cholesterol Calculated   Date Value Ref Range Status   08/22/2017 137 (H) <110 mg/dL Final     Comment:     Borderline high:  110-129 mg/dl  High:            >129 mg/dl       Cholesterol/HDL Ratio   Date Value Ref Range Status   04/10/2015 3.3 0.0 - 5.0 Final     Non HDL Cholesterol   Date Value Ref Range Status   08/22/2017 149 (H) <120 mg/dL Final     Comment:     Borderline high:  120-144 mg/dl  High:            >144 mg/dl              Assessment and Plan:   Juarez  is a 22 year old male with Type 1 diabetes mellitus. Jarred is doing an excellent job with his diabetes cares.  Unfortunately, he is not interested in using a CGM despite our best intentions.  I am concerned with the relative frequency of his lows and the fact that he is not getting symptoms now until he is in the 50s.  I did offer him a trial of the freestyle catherine which he said he would think about.  I made some tweaks to his basal rates during the day and his correction at bedtime. He is up to date on his labs.  His BP is excellent.  I would surmise he is getting some referred discomfort to the umbilical area from a lateral placement of a pod which is unusual but since it was so debilitating for him, I advised him to not use in this area.  There was no evidence for infection.We discussed a transition plan to adult medicine in [place] since his parents have moved from the area.    Orders Placed This Encounter   Procedures     Hemoglobin A1c POCT     Patient Instructions   Omnipod   Basal rates:   12 AM: 0.9  7 AM: 1.05  2 PM: 1.15  9PM: 1.1    Carb ratio   12 AM: 10  3PM: 9  8P 10    Correction factor   12 AM: 50   7 AM: 40  8PM: 55    Target Blood Glucose   12A target and threshold 130  7A target 80, threshold 130   9A target 110 threshold 130    If you would like to do a test run on the freestyle  catherine, contact [facility] and we can get you set up this summer  Stay in touch if you would like to stay with our program.  Otherwise establish care with adult endocrinology in [place] or [place].  Look at [facility] for assistance in finding a new provider.  In the interim, stay in touch with us if you need assistance with adjustments or prescriptions.  Use flanks for pod sites    Thank you for allowing me to participate in the care of your patient.  Please do not hesitate to call Dr Escalante with questions or concerns.    Sincerely,    Angel Escalante MD    Pager [phone]        CC  Patient Care Team:  Markell Zacarias MD as PCP - General (Pediatrics)  MARKELL ZACARIAS    Copy to patient  Tereza Felton   [address] [facility] [address]

## 2018-07-03 NOTE — PATIENT INSTRUCTIONS
Omnipod   Basal rates:   12 AM: 0.9  7 AM: 1.05  2 PM: 1.15  9PM: 1.1    Carb ratio   12 AM: 10  3PM: 9  8P 10    Correction factor   12 AM: 50   7 AM: 40  8PM: 55    Target Blood Glucose   12A target and threshold 130  7A target 80, threshold 130   9A target 110 threshold 130    If you would like to do a test run on the freestyle catherine, contact [facility] and we can get you set up this summer  Stay in touch if you would like to stay with our program.  Otherwise establish care with adult endocrinology in [place] or [place].  Look at [facility] for assistance in finding a new provider.  In the interim, stay in touch with us if you need assistance with adjustments or prescriptions.

## 2018-10-30 ENCOUNTER — TELEPHONE (OUTPATIENT)
Dept: PEDIATRICS | Facility: CLINIC | Age: 23
End: 2018-10-30

## 2018-10-30 NOTE — TELEPHONE ENCOUNTER
PA Initiation    Medication: Apidra 100 U/ML -   Insurance Company: [employer] - Phone [phone] Fax [phone]  Pharmacy Filling the Rx: [id number] - [place], [place] - [address]  Filling Pharmacy Phone: [phone]  Filling Pharmacy Fax:    Start Date: 10/30/2018

## 2018-10-30 NOTE — TELEPHONE ENCOUNTER
Prior Authorization Retail Medication Request    Medication/Dose: Apidra 100 U/ML  ICD code (if different than what is on RX):  same  Previously Tried and Failed:  Analog insulins (Novolog/Humalog)  Rationale:  Analog insulins have caused allergic reaction including lipoatrophy. Apidra has been previous approved and patient is doing well without adverse/allergic reactions    Insurance Name:  [employer] (Prescription Card)  Insurance ID:  [id number]      Pharmacy Information (if different than what is on RX)  Name:  same  Phone:  same

## 2018-10-31 ENCOUNTER — TELEPHONE (OUTPATIENT)
Dept: PEDIATRICS | Facility: CLINIC | Age: 23
End: 2018-10-31

## 2018-10-31 NOTE — TELEPHONE ENCOUNTER
Spoke with Jarred's mother Tereza.  She stated that PA needed to be done for 30 day supply (qty. 300), not for 90 day supply.  Also, the PA needs to be ran through [employer].

## 2018-10-31 NOTE — TELEPHONE ENCOUNTER
Prior Authorization Approval    Authorization Effective Date: 10/30/2018  Authorization Expiration Date: 10/30/2019  Medication: Apidra 100 U/ML - APPROVED  Approved Dose/Quantity:   Reference #:     Insurance Company: [employer] - Phone [phone] Fax [phone]  Expected CoPay:       CoPay Card Available:      Foundation Assistance Needed:    Which Pharmacy is filling the prescription (Not needed for infusion/clinic administered): [id number] - [place], [place] - [address]  Pharmacy Notified: Yes  Patient Notified: Comment:  **Pharmacy will notify patient when script is ready for .**

## 2018-12-07 ENCOUNTER — TELEPHONE (OUTPATIENT)
Dept: PEDIATRICS | Facility: CLINIC | Age: 23
End: 2018-12-07

## 2018-12-07 NOTE — TELEPHONE ENCOUNTER
Prior Authorization Retail Medication Request    Medication/Dose: Apidra Solostar Pens  ICD code (if different than what is on RX):  E10.65  Previously Tried and Failed:  Novolog and Humalog  Rationale:  Analog insulins have caused allergic reaction including lipoatrophy. Apidra has been previous approved and patient is doing well without adverse/allergic reactions.    Insurance Name:  [employer]  Insurance ID:  [id number]      Pharmacy Information (if different than what is on RX)  Name:    Phone:

## 2018-12-10 NOTE — TELEPHONE ENCOUNTER
PA Initiation    Medication: Apidra Solostar Pen- needed for when patient is not on insulin pump  Insurance Company: [employer] - Phone [phone] Fax [phone]  Pharmacy Filling the Rx: [id number] - [place], [place] - [address]  Filling Pharmacy Phone: [phone]  Filling Pharmacy Fax: [phone]  Start Date: 12/10/2018

## 2018-12-12 NOTE — TELEPHONE ENCOUNTER
Prior Authorization Not Needed per Insurance    Medication: Apidra Solostar Pen - NOT NEEDED  Insurance Company: [employer] - Phone [phone] Fax [phone]  Expected CoPay:      Pharmacy Filling the Rx: [id number] - [place], [place] - [address]  Pharmacy Notified: Yes  Patient Notified: Comment:  **Pharmacy will notify patient when script is ready for .**

## 2019-04-12 DIAGNOSIS — E10.9 TYPE 1 DIABETES MELLITUS WITHOUT COMPLICATION (H): ICD-10-CM

## 2019-04-12 NOTE — TELEPHONE ENCOUNTER
Spoke with Jarred who is requesting a refill of his Apidra. He has not been seen since 7/2018 but has moved to [place] and in the process of finding another provider. Refill x 3 months provided given circumstances. Jarred was appreciative of our help and declined assistance in locating an endocrinologist in [place].     Cecy Dominguez, BSN, RN  Pediatric Diabetes Educator  [phone]

## 2019-07-12 DIAGNOSIS — E10.9 TYPE 1 DIABETES MELLITUS WITHOUT COMPLICATION (H): ICD-10-CM

## 2019-07-15 RX ORDER — BLOOD SUGAR DIAGNOSTIC
STRIP MISCELLANEOUS
Qty: 900 EACH | Refills: 0 | Status: SHIPPED | OUTPATIENT
Start: 2019-07-15

## 2019-07-15 NOTE — TELEPHONE ENCOUNTER
I spoke with Juarez's mom today on the phone. He is needing some refills. He hasn't seen Dr. Escalante in 1 year, and the family does not live in [place] anymore. 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 Mom stated she tried to find an adult endo in [place], but was told he couldn't be seen there because he would be unable to follow up closely, due to him living out of state. Mom is wondering if she can get refills on his medications from Dr. Escalante.    After talking with Dr. Escalante, he agreed to refill his prescriptions for 4 more months, however, after that we can not do any further refills. I let mom know that, and let her know that Juarez will need to find a new endo in [place] when he returns this fall.  Mom in agreement with plan. Refills sent to his pharmacy in [place].    Cara ESTRELLA RN  Pediatric Diabetes Educator  [facility]  [phone]

## 2019-11-18 ENCOUNTER — TELEPHONE (OUTPATIENT)
Dept: PEDIATRICS | Facility: CLINIC | Age: 24
End: 2019-11-18

## 2019-11-18 DIAGNOSIS — E10.9 TYPE 1 DIABETES MELLITUS WITHOUT COMPLICATION (H): Primary | ICD-10-CM

## 2019-11-18 NOTE — TELEPHONE ENCOUNTER
Dad called in asking for an endocrinology referral as they have moved out of state and their insurance is requiring this for continued care.     [facility]  [phone]  Patient insurance ID [id number]    New address   [address] [id number]

## 2019-12-18 ENCOUNTER — TELEPHONE (OUTPATIENT)
Dept: ENDOCRINOLOGY | Facility: CLINIC | Age: 24
End: 2019-12-18

## 2019-12-18 NOTE — TELEPHONE ENCOUNTER
Juarez's dad called us today asking about next step plan for Juarez. He has not seen Dr. Escalante in 1.5 years. He did not make an appointment with an adult endo, as we had discussed 4 months ago. Dad now wondering what to do.    I told him that we are unable to refill his prescriptions unless he makes an appointment with Dr. Escalante. I advised him that he make a local adult endo appointment in [place]. Or, that he go to his PCP to ask for insulin refill. Dad states they have a PCP appointment next week. They have enough insulin for 1 more month. We have placed referral for an adult endo. Dad in agreement with plan and has no further questions.     Cara Craig) Eleazar ESTRELLA, RN  Pediatric Diabetes Educator  [facility]  [phone]